# Patient Record
Sex: FEMALE | Race: WHITE | Employment: OTHER | URBAN - METROPOLITAN AREA
[De-identification: names, ages, dates, MRNs, and addresses within clinical notes are randomized per-mention and may not be internally consistent; named-entity substitution may affect disease eponyms.]

---

## 2017-02-21 ENCOUNTER — TELEPHONE (OUTPATIENT)
Dept: ENDOCRINOLOGY | Age: 37
End: 2017-02-21

## 2017-03-15 ENCOUNTER — OFFICE VISIT (OUTPATIENT)
Dept: ENDOCRINOLOGY | Age: 37
End: 2017-03-15

## 2017-03-15 VITALS
OXYGEN SATURATION: 98 % | BODY MASS INDEX: 32.36 KG/M2 | HEART RATE: 125 BPM | DIASTOLIC BLOOD PRESSURE: 80 MMHG | HEIGHT: 67 IN | WEIGHT: 206.2 LBS | SYSTOLIC BLOOD PRESSURE: 120 MMHG

## 2017-03-15 DIAGNOSIS — E89.0 POSTOPERATIVE HYPOTHYROIDISM: Primary | ICD-10-CM

## 2017-03-15 DIAGNOSIS — E78.2 MIXED HYPERLIPIDEMIA: ICD-10-CM

## 2017-03-15 DIAGNOSIS — E22.1 HYPERPROLACTINEMIA (HCC): ICD-10-CM

## 2017-03-15 DIAGNOSIS — R63.5 WEIGHT GAIN, ABNORMAL: ICD-10-CM

## 2017-03-15 DIAGNOSIS — E55.9 VITAMIN D DEFICIENCY: ICD-10-CM

## 2017-03-15 DIAGNOSIS — E04.1 THYROID NODULE: ICD-10-CM

## 2017-03-15 PROCEDURE — 99214 OFFICE O/P EST MOD 30 MIN: CPT | Performed by: NURSE PRACTITIONER

## 2017-03-30 ENCOUNTER — TELEPHONE (OUTPATIENT)
Dept: ENDOCRINOLOGY | Age: 37
End: 2017-03-30

## 2017-04-17 DIAGNOSIS — E89.0 POSTOPERATIVE HYPOTHYROIDISM: ICD-10-CM

## 2017-04-17 DIAGNOSIS — E22.1 HYPERPROLACTINEMIA (HCC): ICD-10-CM

## 2017-04-17 DIAGNOSIS — R63.5 WEIGHT GAIN, ABNORMAL: ICD-10-CM

## 2017-04-17 DIAGNOSIS — E78.2 MIXED HYPERLIPIDEMIA: ICD-10-CM

## 2017-04-17 DIAGNOSIS — E04.1 THYROID NODULE: ICD-10-CM

## 2017-04-17 DIAGNOSIS — E55.9 VITAMIN D DEFICIENCY: ICD-10-CM

## 2017-04-17 LAB
A/G RATIO: 1.3 (ref 1.1–2.2)
ALBUMIN SERPL-MCNC: 4.1 G/DL (ref 3.4–5)
ALP BLD-CCNC: 90 U/L (ref 40–129)
ALT SERPL-CCNC: 27 U/L (ref 10–40)
ANION GAP SERPL CALCULATED.3IONS-SCNC: 17 MMOL/L (ref 3–16)
AST SERPL-CCNC: 18 U/L (ref 15–37)
BILIRUB SERPL-MCNC: 0.4 MG/DL (ref 0–1)
BUN BLDV-MCNC: 14 MG/DL (ref 7–20)
CALCIUM SERPL-MCNC: 9.9 MG/DL (ref 8.3–10.6)
CHLORIDE BLD-SCNC: 99 MMOL/L (ref 99–110)
CHOLESTEROL, TOTAL: 229 MG/DL (ref 0–199)
CO2: 27 MMOL/L (ref 21–32)
CORTISOL - AM: 0.9 UG/DL (ref 4.3–22.4)
CREAT SERPL-MCNC: 0.9 MG/DL (ref 0.6–1.1)
FOLATE: 18.3 NG/ML (ref 4.78–24.2)
GFR AFRICAN AMERICAN: >60
GFR NON-AFRICAN AMERICAN: >60
GLOBULIN: 3.1 G/DL
GLUCOSE BLD-MCNC: 75 MG/DL (ref 70–99)
HDLC SERPL-MCNC: 48 MG/DL (ref 40–60)
HOMOCYSTEINE: 8 UMOL/L (ref 0–10)
LDL CHOLESTEROL CALCULATED: ABNORMAL MG/DL
LDL CHOLESTEROL DIRECT: 121 MG/DL
POTASSIUM SERPL-SCNC: 4.4 MMOL/L (ref 3.5–5.1)
PROLACTIN: 52.6 NG/ML
SODIUM BLD-SCNC: 143 MMOL/L (ref 136–145)
T3 FREE: 2.4 PG/ML (ref 2.3–4.2)
T4 FREE: 0.9 NG/DL (ref 0.9–1.8)
TOTAL PROTEIN: 7.2 G/DL (ref 6.4–8.2)
TRIGL SERPL-MCNC: 388 MG/DL (ref 0–150)
TSH SERPL DL<=0.05 MIU/L-ACNC: 3.94 UIU/ML (ref 0.27–4.2)
VITAMIN B-12: 686 PG/ML (ref 211–911)
VITAMIN D 25-HYDROXY: 31 NG/ML
VLDLC SERPL CALC-MCNC: ABNORMAL MG/DL

## 2017-04-18 ENCOUNTER — TELEPHONE (OUTPATIENT)
Dept: ENDOCRINOLOGY | Age: 37
End: 2017-04-18

## 2017-04-18 DIAGNOSIS — E22.1 HYPERPROLACTINEMIA (HCC): Primary | ICD-10-CM

## 2017-04-18 DIAGNOSIS — E89.0 POSTOPERATIVE HYPOTHYROIDISM: Primary | ICD-10-CM

## 2017-04-18 DIAGNOSIS — R79.89 LOW SERUM CORTISOL LEVEL: ICD-10-CM

## 2017-04-18 RX ORDER — HYDROCORTISONE 5 MG/1
TABLET ORAL
Qty: 90 TABLET | Refills: 3 | Status: SHIPPED | OUTPATIENT
Start: 2017-04-18 | End: 2017-04-18 | Stop reason: CLARIF

## 2017-04-19 LAB
ADRENOCORTICOTROPIC HORMONE: 8 PG/ML (ref 6–58)
METHYLMALONIC ACID: 0.17 UMOL/L (ref 0–0.4)

## 2017-04-21 LAB — T3 REVERSE: 13.9 NG/DL (ref 9–27)

## 2017-05-16 ENCOUNTER — TELEPHONE (OUTPATIENT)
Dept: ENDOCRINOLOGY | Age: 37
End: 2017-05-16

## 2017-08-21 RX ORDER — THYROID 60 MG
TABLET ORAL
Qty: 30 TABLET | Refills: 0 | Status: SHIPPED | OUTPATIENT
Start: 2017-08-21 | End: 2018-02-06 | Stop reason: SDUPTHER

## 2018-01-09 ENCOUNTER — OFFICE VISIT (OUTPATIENT)
Dept: INTERNAL MEDICINE CLINIC | Age: 38
End: 2018-01-09

## 2018-01-09 VITALS
BODY MASS INDEX: 38.45 KG/M2 | HEIGHT: 67 IN | OXYGEN SATURATION: 97 % | DIASTOLIC BLOOD PRESSURE: 60 MMHG | HEART RATE: 100 BPM | SYSTOLIC BLOOD PRESSURE: 110 MMHG | WEIGHT: 245 LBS | TEMPERATURE: 99.3 F

## 2018-01-09 DIAGNOSIS — R68.89 FLU-LIKE SYMPTOMS: ICD-10-CM

## 2018-01-09 DIAGNOSIS — J06.9 UPPER RESPIRATORY TRACT INFECTION, UNSPECIFIED TYPE: ICD-10-CM

## 2018-01-09 DIAGNOSIS — Z01.818 PREOPERATIVE EXAMINATION: Primary | ICD-10-CM

## 2018-01-09 DIAGNOSIS — K21.9 GASTROESOPHAGEAL REFLUX DISEASE, ESOPHAGITIS PRESENCE NOT SPECIFIED: ICD-10-CM

## 2018-01-09 DIAGNOSIS — G43.009 MIGRAINE WITHOUT AURA AND WITHOUT STATUS MIGRAINOSUS, NOT INTRACTABLE: ICD-10-CM

## 2018-01-09 DIAGNOSIS — E89.0 POSTOPERATIVE HYPOTHYROIDISM: ICD-10-CM

## 2018-01-09 LAB
INFLUENZA A ANTIBODY: NORMAL
INFLUENZA B ANTIBODY: NORMAL

## 2018-01-09 PROCEDURE — G8484 FLU IMMUNIZE NO ADMIN: HCPCS | Performed by: INTERNAL MEDICINE

## 2018-01-09 PROCEDURE — 87804 INFLUENZA ASSAY W/OPTIC: CPT | Performed by: INTERNAL MEDICINE

## 2018-01-09 PROCEDURE — 99214 OFFICE O/P EST MOD 30 MIN: CPT | Performed by: INTERNAL MEDICINE

## 2018-01-09 PROCEDURE — G8427 DOCREV CUR MEDS BY ELIG CLIN: HCPCS | Performed by: INTERNAL MEDICINE

## 2018-01-09 PROCEDURE — G8417 CALC BMI ABV UP PARAM F/U: HCPCS | Performed by: INTERNAL MEDICINE

## 2018-01-09 RX ORDER — OMEPRAZOLE 20 MG/1
20 CAPSULE, DELAYED RELEASE ORAL DAILY
Qty: 30 CAPSULE | Refills: 1 | Status: SHIPPED | OUTPATIENT
Start: 2018-01-09 | End: 2018-01-09 | Stop reason: SDUPTHER

## 2018-01-09 RX ORDER — BENZONATATE 100 MG/1
100 CAPSULE ORAL 3 TIMES DAILY PRN
Qty: 30 CAPSULE | Refills: 0 | Status: SHIPPED | OUTPATIENT
Start: 2018-01-09 | End: 2018-02-16 | Stop reason: SDUPTHER

## 2018-01-09 RX ORDER — OMEPRAZOLE 20 MG/1
CAPSULE, DELAYED RELEASE ORAL
Qty: 90 CAPSULE | Refills: 0 | Status: SHIPPED | OUTPATIENT
Start: 2018-01-09 | End: 2018-04-09

## 2018-01-09 RX ORDER — LORATADINE 10 MG/1
10 TABLET ORAL DAILY
Qty: 10 TABLET | Refills: 0 | Status: SHIPPED | OUTPATIENT
Start: 2018-01-09 | End: 2018-02-16 | Stop reason: SDUPTHER

## 2018-01-09 ASSESSMENT — ENCOUNTER SYMPTOMS
SORE THROAT: 1
NAUSEA: 0
SINUS PRESSURE: 0
WHEEZING: 0
SHORTNESS OF BREATH: 0
VOMITING: 0
RHINORRHEA: 1
SINUS PAIN: 0
CONSTIPATION: 0
ABDOMINAL PAIN: 0
COUGH: 1
DIARRHEA: 0
CHEST TIGHTNESS: 0

## 2018-01-09 ASSESSMENT — PATIENT HEALTH QUESTIONNAIRE - PHQ9
SUM OF ALL RESPONSES TO PHQ9 QUESTIONS 1 & 2: 0
1. LITTLE INTEREST OR PLEASURE IN DOING THINGS: 0
SUM OF ALL RESPONSES TO PHQ QUESTIONS 1-9: 0
2. FEELING DOWN, DEPRESSED OR HOPELESS: 0

## 2018-01-09 NOTE — PROGRESS NOTES
Evangelina Loza   YOB: 1980    Date of Visit:  1/9/2018    Chief Complaint   Patient presents with    Established New Doctor    Cough    Pre-op Exam     1/23/2018 surgery for Gastric Bypass       HPI  Pt presents to establish care. Pt states she needs a preoperative exam for gastric bypass surgery scheduled for 1/23/18 to be done by Dr. Shreyas Bolaños for a Laparoscopic Ambar-en-y gastric bypass and laparoscopic removal of gastric band and port for obesity. Co-morbidities of sleep apnea and GERD. Pt states she has had cardiac clearance and is scheduled for preoperative labs on 1/16/18. Pt states she has a preoperative exam with Dr. Vikram Lacy on 1/12/18. Pt has postoperative hypothyroidism. Pt takes [place] Thyroid. Pt sees Psychiatry for major depression, anxiety, and insomnia. Pt has migraine headaches. Pt has 5 migraines per month. Migraines located forehead and throbbing. Pt has light sensitivity and noise sensitivity. Pt has nausea and vomiting with migraines. Pt takes Imitrex and Zofran. Pt has GERD. Pt takes Prilosec. Pt has heartburn and reflux sometimes. Pt states she avoids spicy foods and has cut back caffeine. Pt has 3 day h/o dry cough, sore throat, hurts to swallow a little, feeling feverish (didn't take temperature), little chills, and little runny nose. Pt states she has been sleeping a lot. Pt states she has been taking an otc DM cough syrup and Ibuprofen which helps. Review of Systems   Constitutional: Positive for chills and fever. Negative for appetite change, fatigue and unexpected weight change. HENT: Positive for rhinorrhea and sore throat. Negative for congestion, ear pain, postnasal drip, sinus pain, sinus pressure, sneezing and trouble swallowing. Eyes: Negative for visual disturbance. Respiratory: Positive for cough. Negative for chest tightness, shortness of breath and wheezing.     Cardiovascular: Negative for chest pain, palpitations and leg swelling. Gastrointestinal: Negative for abdominal distention, abdominal pain, blood in stool, constipation, diarrhea, nausea and vomiting. Endocrine: Negative for cold intolerance and heat intolerance. Genitourinary: Negative for dysuria, frequency and hematuria. Musculoskeletal: Negative for arthralgias, back pain and myalgias. Skin: Negative for rash. Neurological: Positive for headaches. Negative for dizziness, tremors, syncope, weakness, light-headedness and numbness. Psychiatric/Behavioral: Negative for decreased concentration, dysphoric mood and sleep disturbance. The patient is not nervous/anxious. Past Medical History:   Diagnosis Date    ADHD (attention deficit hyperactivity disorder)     Amenorrhea     Anemia     Anxiety     Depression     Frequent headaches     History of colonic polyps     Hyperprolactinemia (HCC)     Hypothyroidism     Mitral valve prolapse     Obstructive sleep apnea     Thyroid nodule     UTI (lower urinary tract infection)     frequent, pyelonephritis       Allergies   Allergen Reactions    Bactrim [Sulfamethoxazole-Trimethoprim] Nausea And Vomiting     Outpatient Prescriptions Marked as Taking for the 1/9/18 encounter (Office Visit) with Jennie Hernandez MD   Medication Sig Dispense Refill    ARMOUR THYROID 60 MG tablet TAKE 1 TABLET BY MOUTH EVERY DAY 30 tablet 0    dicyclomine (BENTYL) 10 MG capsule Take 1 capsule by mouth every 6 hours as needed (cramps) 20 capsule 0    Venlafaxine HCl (EFFEXOR PO) Take by mouth      buPROPion (WELLBUTRIN XL) 300 MG XL tablet   Take 300 mg by mouth every morning       zolpidem (AMBIEN) 10 MG tablet   Take 10 mg by mouth nightly as needed       SUMAtriptan (IMITREX) 100 MG tablet   Take 100 mg by mouth once as needed       clonazePAM (KLONOPIN) 2 MG tablet Take 2 mg by mouth 3 times daily as needed for Anxiety.            Vitals:    01/09/18 1508   BP: 110/60   Site: Right Arm   Position:

## 2018-01-11 ENCOUNTER — TELEPHONE (OUTPATIENT)
Dept: INTERNAL MEDICINE CLINIC | Age: 38
End: 2018-01-11

## 2018-01-11 DIAGNOSIS — J06.9 UPPER RESPIRATORY TRACT INFECTION, UNSPECIFIED TYPE: Primary | ICD-10-CM

## 2018-01-11 RX ORDER — AZITHROMYCIN 250 MG/1
TABLET, FILM COATED ORAL
Qty: 1 PACKET | Refills: 0 | Status: SHIPPED | OUTPATIENT
Start: 2018-01-11 | End: 2018-01-21

## 2018-01-11 RX ORDER — BROMPHENIRAMINE MALEATE, PSEUDOEPHEDRINE HYDROCHLORIDE, AND DEXTROMETHORPHAN HYDROBROMIDE 2; 30; 10 MG/5ML; MG/5ML; MG/5ML
5 SYRUP ORAL 4 TIMES DAILY PRN
Qty: 120 ML | Refills: 0 | Status: SHIPPED | OUTPATIENT
Start: 2018-01-11 | End: 2018-01-11 | Stop reason: SDUPTHER

## 2018-01-11 RX ORDER — BROMPHENIRAMINE MALEATE, PSEUDOEPHEDRINE HYDROCHLORIDE, AND DEXTROMETHORPHAN HYDROBROMIDE 2; 30; 10 MG/5ML; MG/5ML; MG/5ML
5 SYRUP ORAL 4 TIMES DAILY PRN
Qty: 120 ML | Refills: 0 | Status: SHIPPED | COMMUNITY
Start: 2018-01-11 | End: 2018-01-11 | Stop reason: SDUPTHER

## 2018-01-11 RX ORDER — AZITHROMYCIN 250 MG/1
TABLET, FILM COATED ORAL
Qty: 1 PACKET | Refills: 0 | Status: SHIPPED | OUTPATIENT
Start: 2018-01-11 | End: 2018-01-11 | Stop reason: SDUPTHER

## 2018-01-11 RX ORDER — BROMPHENIRAMINE MALEATE, PSEUDOEPHEDRINE HYDROCHLORIDE, AND DEXTROMETHORPHAN HYDROBROMIDE 2; 30; 10 MG/5ML; MG/5ML; MG/5ML
5 SYRUP ORAL 4 TIMES DAILY PRN
Qty: 120 ML | Refills: 0 | Status: SHIPPED | OUTPATIENT
Start: 2018-01-11 | End: 2018-04-09 | Stop reason: ALTCHOICE

## 2018-01-11 ASSESSMENT — ENCOUNTER SYMPTOMS
TROUBLE SWALLOWING: 0
BACK PAIN: 0
ABDOMINAL DISTENTION: 0
BLOOD IN STOOL: 0

## 2018-01-30 ENCOUNTER — TELEPHONE (OUTPATIENT)
Dept: INTERNAL MEDICINE CLINIC | Age: 38
End: 2018-01-30

## 2018-02-01 ENCOUNTER — TELEPHONE (OUTPATIENT)
Dept: INTERNAL MEDICINE CLINIC | Age: 38
End: 2018-02-01

## 2018-02-06 ENCOUNTER — OFFICE VISIT (OUTPATIENT)
Dept: INTERNAL MEDICINE CLINIC | Age: 38
End: 2018-02-06

## 2018-02-06 VITALS
WEIGHT: 236.4 LBS | RESPIRATION RATE: 16 BRPM | BODY MASS INDEX: 37.1 KG/M2 | SYSTOLIC BLOOD PRESSURE: 104 MMHG | DIASTOLIC BLOOD PRESSURE: 78 MMHG | HEIGHT: 67 IN | TEMPERATURE: 98.1 F | HEART RATE: 110 BPM | OXYGEN SATURATION: 98 %

## 2018-02-06 DIAGNOSIS — R06.2 WHEEZING: ICD-10-CM

## 2018-02-06 DIAGNOSIS — R06.02 SHORTNESS OF BREATH: ICD-10-CM

## 2018-02-06 DIAGNOSIS — G43.009 MIGRAINE WITHOUT AURA AND WITHOUT STATUS MIGRAINOSUS, NOT INTRACTABLE: ICD-10-CM

## 2018-02-06 DIAGNOSIS — R03.0 TRANSIENT ELEVATED BLOOD PRESSURE: ICD-10-CM

## 2018-02-06 DIAGNOSIS — E89.0 POSTOPERATIVE HYPOTHYROIDISM: Primary | ICD-10-CM

## 2018-02-06 DIAGNOSIS — E04.1 THYROID NODULE: ICD-10-CM

## 2018-02-06 DIAGNOSIS — Z23 NEEDS FLU SHOT: ICD-10-CM

## 2018-02-06 DIAGNOSIS — L30.4 INTERTRIGO: ICD-10-CM

## 2018-02-06 LAB
T4 FREE: 1 NG/DL (ref 0.9–1.8)
TSH SERPL DL<=0.05 MIU/L-ACNC: 0.63 UIU/ML (ref 0.27–4.2)

## 2018-02-06 PROCEDURE — G0008 ADMIN INFLUENZA VIRUS VAC: HCPCS | Performed by: INTERNAL MEDICINE

## 2018-02-06 PROCEDURE — 99214 OFFICE O/P EST MOD 30 MIN: CPT | Performed by: INTERNAL MEDICINE

## 2018-02-06 PROCEDURE — 1036F TOBACCO NON-USER: CPT | Performed by: INTERNAL MEDICINE

## 2018-02-06 PROCEDURE — G8427 DOCREV CUR MEDS BY ELIG CLIN: HCPCS | Performed by: INTERNAL MEDICINE

## 2018-02-06 PROCEDURE — G8417 CALC BMI ABV UP PARAM F/U: HCPCS | Performed by: INTERNAL MEDICINE

## 2018-02-06 PROCEDURE — 90630 INFLUENZA, QUADV, 18-64 YRS, ID, PF, MICRO INJ, 0.1ML (FLUZONE QUADV, PF): CPT | Performed by: INTERNAL MEDICINE

## 2018-02-06 PROCEDURE — G8484 FLU IMMUNIZE NO ADMIN: HCPCS | Performed by: INTERNAL MEDICINE

## 2018-02-06 RX ORDER — SUMATRIPTAN 100 MG/1
100 TABLET, FILM COATED ORAL
Qty: 9 TABLET | Refills: 3 | Status: SHIPPED | OUTPATIENT
Start: 2018-02-06 | End: 2018-07-04 | Stop reason: SDUPTHER

## 2018-02-06 RX ORDER — LEVOTHYROXINE AND LIOTHYRONINE 38; 9 UG/1; UG/1
TABLET ORAL
Qty: 30 TABLET | Refills: 0 | Status: SHIPPED | OUTPATIENT
Start: 2018-02-06 | End: 2018-03-05 | Stop reason: SDUPTHER

## 2018-02-06 RX ORDER — NYSTATIN 100000 U/G
CREAM TOPICAL
Qty: 30 G | Refills: 0 | Status: SHIPPED | OUTPATIENT
Start: 2018-02-06 | End: 2018-03-25 | Stop reason: SDUPTHER

## 2018-02-06 RX ORDER — ALBUTEROL SULFATE 90 UG/1
2 AEROSOL, METERED RESPIRATORY (INHALATION) EVERY 6 HOURS PRN
Qty: 1 INHALER | Refills: 0 | Status: SHIPPED | OUTPATIENT
Start: 2018-02-06 | End: 2018-04-05 | Stop reason: SDUPTHER

## 2018-02-06 ASSESSMENT — ENCOUNTER SYMPTOMS
SHORTNESS OF BREATH: 1
DIARRHEA: 0
WHEEZING: 1
CONSTIPATION: 0
SORE THROAT: 0
TROUBLE SWALLOWING: 0

## 2018-02-06 NOTE — PATIENT INSTRUCTIONS
-Chest xray  -Pulmonary function tests- Spirometry with and without bronchodilator and methacholine challenge  -Thyroid ultrasound  -Continue same medications  -Nystatin cream twice daily   -ProAir HFA inhaler 2 puffs every 6 hours as needed for shortness of breath and/or wheezing  -Referral to Endocrinology

## 2018-02-06 NOTE — PROGRESS NOTES
Eyes: Negative for visual disturbance. Respiratory: Positive for shortness of breath and wheezing. Negative for cough and chest tightness. Cardiovascular: Negative for chest pain, palpitations and leg swelling. Gastrointestinal: Negative for abdominal pain, blood in stool, constipation, diarrhea, nausea and vomiting. Endocrine: Positive for cold intolerance. Negative for heat intolerance. Genitourinary: Negative for dysuria, frequency and hematuria. Musculoskeletal: Negative for arthralgias and myalgias. Skin: Negative for rash. Neurological: Positive for headaches. Negative for dizziness, tremors, syncope, weakness, light-headedness and numbness. Psychiatric/Behavioral: Negative for dysphoric mood and sleep disturbance. The patient is not nervous/anxious. Allergies   Allergen Reactions    Bactrim [Sulfamethoxazole-Trimethoprim] Nausea And Vomiting    Naproxen Nausea And Vomiting     Outpatient Prescriptions Marked as Taking for the 2/6/18 encounter (Office Visit) with Timur Dey MD   Medication Sig Dispense Refill    loratadine (CLARITIN) 10 MG tablet Take 1 tablet by mouth daily 10 tablet 0    omeprazole (PRILOSEC) 20 MG delayed release capsule TAKE ONE CAPSULE BY MOUTH DAILY 90 capsule 0    ARMOUR THYROID 60 MG tablet TAKE 1 TABLET BY MOUTH EVERY DAY 30 tablet 0    dicyclomine (BENTYL) 10 MG capsule Take 1 capsule by mouth every 6 hours as needed (cramps) 20 capsule 0    Venlafaxine HCl (EFFEXOR PO) Take by mouth      buPROPion (WELLBUTRIN XL) 300 MG XL tablet   Take 300 mg by mouth every morning       zolpidem (AMBIEN) 10 MG tablet   Take 10 mg by mouth nightly as needed       SUMAtriptan (IMITREX) 100 MG tablet   Take 100 mg by mouth once as needed       clonazePAM (KLONOPIN) 2 MG tablet Take 2 mg by mouth 3 times daily as needed for Anxiety.            Vitals:    02/06/18 1412   BP: 104/78   Site: Right Arm   Position: Sitting   Cuff Size: Medium Adult   Pulse: 110

## 2018-02-12 ASSESSMENT — ENCOUNTER SYMPTOMS
SINUS PRESSURE: 0
VOMITING: 0
BLOOD IN STOOL: 0
CHEST TIGHTNESS: 0
RHINORRHEA: 0
COUGH: 0
NAUSEA: 0
ABDOMINAL PAIN: 0

## 2018-02-14 ENCOUNTER — TELEPHONE (OUTPATIENT)
Dept: INTERNAL MEDICINE CLINIC | Age: 38
End: 2018-02-14

## 2018-02-16 DIAGNOSIS — J06.9 UPPER RESPIRATORY TRACT INFECTION, UNSPECIFIED TYPE: ICD-10-CM

## 2018-02-16 RX ORDER — LORATADINE 10 MG/1
10 TABLET ORAL DAILY
Qty: 10 TABLET | Refills: 0 | Status: SHIPPED | OUTPATIENT
Start: 2018-02-16 | End: 2018-04-09

## 2018-02-16 RX ORDER — BENZONATATE 100 MG/1
CAPSULE ORAL
Qty: 30 CAPSULE | Refills: 0 | Status: SHIPPED | OUTPATIENT
Start: 2018-02-16 | End: 2018-04-09 | Stop reason: ALTCHOICE

## 2018-02-19 PROBLEM — R05.9 COUGH: Status: ACTIVE | Noted: 2018-02-19

## 2018-02-20 ENCOUNTER — TELEPHONE (OUTPATIENT)
Dept: INTERNAL MEDICINE CLINIC | Age: 38
End: 2018-02-20

## 2018-03-05 DIAGNOSIS — E89.0 POSTOPERATIVE HYPOTHYROIDISM: ICD-10-CM

## 2018-03-06 RX ORDER — THYROID 60 MG
TABLET ORAL
Qty: 30 TABLET | Refills: 2 | Status: SHIPPED | OUTPATIENT
Start: 2018-03-06 | End: 2018-04-09 | Stop reason: SDUPTHER

## 2018-03-25 DIAGNOSIS — L30.4 INTERTRIGO: ICD-10-CM

## 2018-03-26 RX ORDER — NYSTATIN 100000 U/G
CREAM TOPICAL
Qty: 30 G | Refills: 0 | Status: SHIPPED | OUTPATIENT
Start: 2018-03-26 | End: 2018-04-09

## 2018-03-26 NOTE — TELEPHONE ENCOUNTER
Last Office Visit: 2/6/2018  Future Office Visit: 4/9/2018  Last Filled: 2/6/2018 Nystatin [id number] unit/gm cream disp # 30

## 2018-04-05 DIAGNOSIS — R06.2 WHEEZING: ICD-10-CM

## 2018-04-05 DIAGNOSIS — R06.02 SHORTNESS OF BREATH: ICD-10-CM

## 2018-04-09 ENCOUNTER — OFFICE VISIT (OUTPATIENT)
Dept: ENDOCRINOLOGY | Age: 38
End: 2018-04-09

## 2018-04-09 VITALS
HEART RATE: 101 BPM | DIASTOLIC BLOOD PRESSURE: 80 MMHG | BODY MASS INDEX: 32.71 KG/M2 | WEIGHT: 208.4 LBS | HEIGHT: 67 IN | OXYGEN SATURATION: 97 % | SYSTOLIC BLOOD PRESSURE: 102 MMHG

## 2018-04-09 DIAGNOSIS — N91.4 SECONDARY OLIGOMENORRHEA: ICD-10-CM

## 2018-04-09 DIAGNOSIS — E55.9 VITAMIN D DEFICIENCY: ICD-10-CM

## 2018-04-09 DIAGNOSIS — E27.8 ADRENAL NODULE (HCC): ICD-10-CM

## 2018-04-09 DIAGNOSIS — E04.2 MULTINODULAR GOITER: ICD-10-CM

## 2018-04-09 DIAGNOSIS — E66.9 CLASS 1 OBESITY WITH BODY MASS INDEX (BMI) OF 32.0 TO 32.9 IN ADULT, UNSPECIFIED OBESITY TYPE, UNSPECIFIED WHETHER SERIOUS COMORBIDITY PRESENT: ICD-10-CM

## 2018-04-09 DIAGNOSIS — E03.9 ACQUIRED HYPOTHYROIDISM: Primary | ICD-10-CM

## 2018-04-09 DIAGNOSIS — E89.0 POSTOPERATIVE HYPOTHYROIDISM: ICD-10-CM

## 2018-04-09 DIAGNOSIS — R23.2 FLUSHING: ICD-10-CM

## 2018-04-09 DIAGNOSIS — E22.1 HYPERPROLACTINEMIA (HCC): ICD-10-CM

## 2018-04-09 PROBLEM — E27.9 ADRENAL NODULE (HCC): Status: ACTIVE | Noted: 2018-04-09

## 2018-04-09 PROBLEM — E66.811 CLASS 1 OBESITY IN ADULT: Status: ACTIVE | Noted: 2018-04-09

## 2018-04-09 PROCEDURE — 99215 OFFICE O/P EST HI 40 MIN: CPT | Performed by: INTERNAL MEDICINE

## 2018-04-09 PROCEDURE — G8427 DOCREV CUR MEDS BY ELIG CLIN: HCPCS | Performed by: INTERNAL MEDICINE

## 2018-04-09 PROCEDURE — 1036F TOBACCO NON-USER: CPT | Performed by: INTERNAL MEDICINE

## 2018-04-09 PROCEDURE — G8417 CALC BMI ABV UP PARAM F/U: HCPCS | Performed by: INTERNAL MEDICINE

## 2018-04-09 RX ORDER — RISPERIDONE 2 MG/1
2 TABLET, FILM COATED ORAL NIGHTLY
Qty: 60 TABLET | Refills: 0
Start: 2018-04-09

## 2018-04-09 RX ORDER — THYROID 60 MG
TABLET ORAL
Qty: 90 TABLET | Refills: 0 | Status: SHIPPED | OUTPATIENT
Start: 2018-04-09 | End: 2018-10-12 | Stop reason: SDUPTHER

## 2018-04-09 ASSESSMENT — PATIENT HEALTH QUESTIONNAIRE - PHQ9
2. FEELING DOWN, DEPRESSED OR HOPELESS: 0
SUM OF ALL RESPONSES TO PHQ QUESTIONS 1-9: 0
SUM OF ALL RESPONSES TO PHQ9 QUESTIONS 1 & 2: 0
1. LITTLE INTEREST OR PLEASURE IN DOING THINGS: 0

## 2018-04-11 PROBLEM — R05.9 COUGH: Status: RESOLVED | Noted: 2018-02-19 | Resolved: 2018-04-11

## 2018-04-12 DIAGNOSIS — E55.9 VITAMIN D DEFICIENCY: ICD-10-CM

## 2018-04-12 DIAGNOSIS — E22.1 HYPERPROLACTINEMIA (HCC): ICD-10-CM

## 2018-04-12 DIAGNOSIS — E03.9 ACQUIRED HYPOTHYROIDISM: ICD-10-CM

## 2018-04-12 DIAGNOSIS — N91.4 SECONDARY OLIGOMENORRHEA: ICD-10-CM

## 2018-04-12 DIAGNOSIS — E89.0 POSTOPERATIVE HYPOTHYROIDISM: ICD-10-CM

## 2018-04-12 DIAGNOSIS — E27.8 ADRENAL NODULE (HCC): ICD-10-CM

## 2018-04-12 DIAGNOSIS — R23.2 FLUSHING: ICD-10-CM

## 2018-04-12 LAB
24HR URINE VOLUME (ML): 1300 ML
ANTI-THYROGLOB ABS: <10 IU/ML
CORTISOL - AM: 15 UG/DL (ref 4.3–22.4)
CREAT SERPL-MCNC: 0.9 MG/DL (ref 0.6–1.2)
CREATININE 24 HOUR URINE: 1.2 G/24HR (ref 0.6–1.5)
CREATININE CLEARANCE: 95 ML/MIN (ref 88–128)
ESTRADIOL LEVEL: 24 PG/ML
FOLLICLE STIMULATING HORMONE: 10.9 MIU/ML
LUTEINIZING HORMONE: 11.9 MIU/ML
Lab: 24 HR
PROLACTIN: 39.3 NG/ML
T3 FREE: 2.9 PG/ML (ref 2.3–4.2)
T3 TOTAL: 1.32 NG/ML (ref 0.8–2)
T4 FREE: 0.9 NG/DL (ref 0.9–1.8)
T4 TOTAL: 5.8 UG/DL (ref 4.5–10.9)
THYROID PEROXIDASE (TPO) ABS: 16 IU/ML
TSH SERPL DL<=0.05 MIU/L-ACNC: 1.32 UIU/ML (ref 0.27–4.2)
VITAMIN D 25-HYDROXY: 42.6 NG/ML

## 2018-04-13 LAB
ADRENOCORTICOTROPIC HORMONE: 44 PG/ML (ref 6–58)
DHEAS (DHEA SULFATE): 281 UG/DL (ref 45–270)
INSULIN COMMENT: NORMAL
INSULIN REFERENCE RANGE:: NORMAL
INSULIN: 18.1 MU/L
SEX HORMONE BINDING GLOBULIN: 32 NMOL/L (ref 30–135)
TESTOSTERONE FREE-NONMALE: 16.6 PG/ML (ref 1.3–9.2)
TESTOSTERONE TOTAL: 89 NG/DL (ref 20–70)

## 2018-04-14 LAB
ALDOSTERONE: 29 NG/DL
IGF-1 (INSULIN-LIKE GROWTH I): 125 NG/ML (ref 80–277)
INSULIN-LIKE GROWTH FACTOR-1 Z-SCORE: -0.7
RENIN ACTIVITY: 5.1 NG/ML/HR

## 2018-04-15 LAB
5 HIAA URINE (PER GM CREAT): 4 MG/GCR (ref 0–14)
5-HIAA 24 HOUR URINE: 5 MG/D (ref 0–15)
5-HIAA INTERPRETATION: NORMAL
5-HIAA URINE: 3.9 MG/L

## 2018-04-16 LAB
CATE U INT: NORMAL
CORTISOL (UR), FREE: 9.6 UG/D
CORTISOL URINE, FREE (/G CRT): 7.71 UG/G CRT
CORTISOL,F,UG/L,U: 7.4 UG/L
CREATININE 24 HOUR URINE: 1248 MG/D (ref 700–1600)
CREATININE URINE: 96 MG/DL
DOPAMINE (G CRT): 133 UG/G CRT (ref 0–250)
DOPAMINE 24 HOUR URINE: 166 UG/D (ref 77–324)
DOPAMINE, (UG/L): 128 UG/L
EPINEPHRINE (G CRT): 4 UG/G CRT (ref 0–20)
EPINEPHRINE 24 HOUR URINE: 5 UG/D (ref 1–7)
EPINEPHRINE, (UG/L): 4 UG/L
HOURS COLLECTED: 24 HR
INTERPRETATION, OPI7M: NORMAL
METANEPH/PLASMA INTERP: NORMAL
METANEPHRINE FREE PLASMA: 0.12 NMOL/L (ref 0–0.49)
NOREPINEPH (G CRT): 21 UG/G CRT (ref 0–45)
NOREPINEPHRINE 24 HOUR URINE: 26 UG/D (ref 16–71)
NOREPINEPHRINE, (UG/L): 20 UG/L
NORMETANEPHRINE FREE PLASMA: 0.42 NMOL/L (ref 0–0.89)
T3 REVERSE: 14.5 NG/DL (ref 9–27)
URINE TOTAL VOLUME: 1300 ML

## 2018-04-17 LAB — 17-OH PROGESTERONE LCMS: 44.9 NG/DL

## 2018-04-24 ENCOUNTER — HOSPITAL ENCOUNTER (OUTPATIENT)
Dept: CT IMAGING | Age: 38
Discharge: HOME | End: 2018-04-24
Admitting: INTERNAL MEDICINE

## 2018-04-24 DIAGNOSIS — E27.8 ADRENAL NODULE (HCC): ICD-10-CM

## 2018-04-24 DIAGNOSIS — E04.2 NONTOXIC MULTINODULAR GOITER: ICD-10-CM

## 2018-04-24 DIAGNOSIS — E04.2 MULTINODULAR GOITER: ICD-10-CM

## 2018-05-01 ENCOUNTER — PATIENT MESSAGE (OUTPATIENT)
Dept: ENDOCRINOLOGY | Age: 38
End: 2018-05-01

## 2018-05-01 ENCOUNTER — TELEPHONE (OUTPATIENT)
Dept: ENDOCRINOLOGY | Age: 38
End: 2018-05-01

## 2018-05-09 ENCOUNTER — OFFICE VISIT (OUTPATIENT)
Dept: ENDOCRINOLOGY | Age: 38
End: 2018-05-09

## 2018-05-09 VITALS
WEIGHT: 201.2 LBS | SYSTOLIC BLOOD PRESSURE: 124 MMHG | HEART RATE: 108 BPM | DIASTOLIC BLOOD PRESSURE: 82 MMHG | OXYGEN SATURATION: 97 % | HEIGHT: 67 IN | BODY MASS INDEX: 31.58 KG/M2

## 2018-05-09 DIAGNOSIS — E28.2 PCOS (POLYCYSTIC OVARIAN SYNDROME): ICD-10-CM

## 2018-05-09 DIAGNOSIS — E04.2 MULTINODULAR GOITER: ICD-10-CM

## 2018-05-09 DIAGNOSIS — E66.9 CLASS 1 OBESITY WITH BODY MASS INDEX (BMI) OF 31.0 TO 31.9 IN ADULT, UNSPECIFIED OBESITY TYPE, UNSPECIFIED WHETHER SERIOUS COMORBIDITY PRESENT: ICD-10-CM

## 2018-05-09 DIAGNOSIS — E22.1 HYPERPROLACTINEMIA (HCC): ICD-10-CM

## 2018-05-09 DIAGNOSIS — E03.9 ACQUIRED HYPOTHYROIDISM: Primary | ICD-10-CM

## 2018-05-09 DIAGNOSIS — R23.2 FLUSHING: ICD-10-CM

## 2018-05-09 DIAGNOSIS — N91.4 SECONDARY OLIGOMENORRHEA: ICD-10-CM

## 2018-05-09 DIAGNOSIS — E55.9 VITAMIN D DEFICIENCY: ICD-10-CM

## 2018-05-09 DIAGNOSIS — E27.8 ADRENAL NODULE (HCC): ICD-10-CM

## 2018-05-09 PROCEDURE — 1036F TOBACCO NON-USER: CPT | Performed by: INTERNAL MEDICINE

## 2018-05-09 PROCEDURE — G8427 DOCREV CUR MEDS BY ELIG CLIN: HCPCS | Performed by: INTERNAL MEDICINE

## 2018-05-09 PROCEDURE — G8417 CALC BMI ABV UP PARAM F/U: HCPCS | Performed by: INTERNAL MEDICINE

## 2018-05-09 PROCEDURE — 99215 OFFICE O/P EST HI 40 MIN: CPT | Performed by: INTERNAL MEDICINE

## 2018-05-09 ASSESSMENT — PATIENT HEALTH QUESTIONNAIRE - PHQ9
2. FEELING DOWN, DEPRESSED OR HOPELESS: 0
1. LITTLE INTEREST OR PLEASURE IN DOING THINGS: 0
SUM OF ALL RESPONSES TO PHQ QUESTIONS 1-9: 0
SUM OF ALL RESPONSES TO PHQ9 QUESTIONS 1 & 2: 0

## 2018-06-13 RX ORDER — PROMETHAZINE HYDROCHLORIDE 25 MG/1
TABLET ORAL
Qty: 32 TABLET | Refills: 0 | Status: SHIPPED | OUTPATIENT
Start: 2018-06-13 | End: 2018-09-25 | Stop reason: SDUPTHER

## 2018-07-04 DIAGNOSIS — G43.009 MIGRAINE WITHOUT AURA AND WITHOUT STATUS MIGRAINOSUS, NOT INTRACTABLE: ICD-10-CM

## 2018-07-05 RX ORDER — SUMATRIPTAN 100 MG/1
100 TABLET, FILM COATED ORAL
Qty: 9 TABLET | Refills: 0 | Status: SHIPPED | OUTPATIENT
Start: 2018-07-05 | End: 2018-10-30

## 2018-07-10 RX ORDER — TRAZODONE HYDROCHLORIDE 50 MG/1
TABLET ORAL
Qty: 30 TABLET | Refills: 0 | Status: SHIPPED | OUTPATIENT
Start: 2018-07-10 | End: 2018-08-20 | Stop reason: ALTCHOICE

## 2018-07-12 ENCOUNTER — TELEPHONE (OUTPATIENT)
Dept: INTERNAL MEDICINE CLINIC | Age: 38
End: 2018-07-12

## 2018-08-16 ENCOUNTER — TELEPHONE (OUTPATIENT)
Dept: ENDOCRINOLOGY | Age: 38
End: 2018-08-16

## 2018-08-20 ENCOUNTER — OFFICE VISIT (OUTPATIENT)
Dept: ENDOCRINOLOGY | Age: 38
End: 2018-08-20

## 2018-08-20 VITALS
DIASTOLIC BLOOD PRESSURE: 62 MMHG | HEART RATE: 97 BPM | WEIGHT: 177 LBS | OXYGEN SATURATION: 98 % | HEIGHT: 67 IN | SYSTOLIC BLOOD PRESSURE: 130 MMHG | BODY MASS INDEX: 27.78 KG/M2

## 2018-08-20 DIAGNOSIS — E66.3 OVERWEIGHT (BMI 25.0-29.9): ICD-10-CM

## 2018-08-20 DIAGNOSIS — E22.1 HYPERPROLACTINEMIA (HCC): ICD-10-CM

## 2018-08-20 DIAGNOSIS — E27.8 ADRENAL NODULE (HCC): ICD-10-CM

## 2018-08-20 DIAGNOSIS — E04.2 MULTINODULAR GOITER: ICD-10-CM

## 2018-08-20 DIAGNOSIS — E03.9 ACQUIRED HYPOTHYROIDISM: Primary | ICD-10-CM

## 2018-08-20 DIAGNOSIS — E78.2 MIXED HYPERLIPIDEMIA: ICD-10-CM

## 2018-08-20 DIAGNOSIS — N91.4 SECONDARY OLIGOMENORRHEA: ICD-10-CM

## 2018-08-20 DIAGNOSIS — E28.2 PCOS (POLYCYSTIC OVARIAN SYNDROME): ICD-10-CM

## 2018-08-20 DIAGNOSIS — E55.9 VITAMIN D DEFICIENCY: ICD-10-CM

## 2018-08-20 PROCEDURE — G8417 CALC BMI ABV UP PARAM F/U: HCPCS | Performed by: INTERNAL MEDICINE

## 2018-08-20 PROCEDURE — 1036F TOBACCO NON-USER: CPT | Performed by: INTERNAL MEDICINE

## 2018-08-20 PROCEDURE — 99214 OFFICE O/P EST MOD 30 MIN: CPT | Performed by: INTERNAL MEDICINE

## 2018-08-20 PROCEDURE — G8427 DOCREV CUR MEDS BY ELIG CLIN: HCPCS | Performed by: INTERNAL MEDICINE

## 2018-08-20 ASSESSMENT — PATIENT HEALTH QUESTIONNAIRE - PHQ9
SUM OF ALL RESPONSES TO PHQ9 QUESTIONS 1 & 2: 0
2. FEELING DOWN, DEPRESSED OR HOPELESS: 0
1. LITTLE INTEREST OR PLEASURE IN DOING THINGS: 0
SUM OF ALL RESPONSES TO PHQ QUESTIONS 1-9: 0
SUM OF ALL RESPONSES TO PHQ QUESTIONS 1-9: 0

## 2018-08-20 NOTE — PROGRESS NOTES
SUBJECTIVE:  Tony Lawton is a 41 y.o. female who is here for hypothyroidism, hyperprolactinemia, oligomenorrhea, vitamin D, thyroid nodule. 1. Acquired hypothyroidism    This started in 1996. Patient was diagnosed with thyroid nodules. The problem has been gradually worsening. Patient started medication in 1996. Currently patient is on: [place]. Misses  0 doses a month. Couldn't tolerate Synthroid. Current complaints: fatigue, dry skin, SOB, can't cool down. 2. Multinodular Goiter    History of obstructive symptoms: difficulty swallowing No, changes in voice/hoarseness No.    History of radiation to patient's neck: No  Resent iodine exposure: No  Family history includes no thyroid abnormalities. Family history of thyroid cancer: No    3. Hyperprolactinemia (HCC)  On risperidone. Has headaches, severe, takes Imitrex. Same. No galactorrhea. 4. Overweight  Gained 80 lbs for no reason. Had R&Y gastric bypass in 1/2018. Lost 45 lbs. Has purple stria on the breasts. 5. Secondary oligomenorrhea  Has hirsutism. Might be PCOS. Has no period for 3 years. Started periods at age 16. Regular, became irregular after Depo, stopped, then restarted. Missed last one. Not on Depoprovera for 6 years. Has spotting every other month. Has purple stria on the breasts. 6. Vitamin D deficiency  No bone pain    7. Left adrenal nodule  No abdominal pain. Has spells of anxiety, palpitations, flushing, 3 times a week, more frequent,;last 1 hour. 8.  PCOS  Has hirsutism, irregular periods. 10. Hyperlipidemia  No muscle pain    EXAMINATION:   CT ABDOMEN WITH AND WITHOUT CONTRAST 4/24/2018 2:01 pm       TECHNIQUE:   CT of the abdomen was performed without and with the administration of   intravenous contrast. Multiplanar reformatted images are provided for review.    Dose modulation, iterative reconstruction, and/or weight based adjustment of   the mA/kV was utilized to reduce the radiation dose to as low Vertebrobasilar Arteries:  Normal flow voids.              Visualized Bone Marrow:  Normal.                  Visualized Craniofacial Structures-   · Orbits:  Negative.       · Mastoid air cells:  Negative.                · Paranasal sinuses:  Negative.                             Impression   IMPRESSION:     No acute infarction, mass or hemorrhage is identified.        Normal pituitary gland. No microadenoma is identified.             Normal brain MRI with and without contrast.                         EXAMINATION:   CT OF THE ABDOMEN AND PELVIS WITH CONTRAST 12/10/2016 11:25 pm       TECHNIQUE:   CT of the abdomen and pelvis was performed with the administration of   intravenous contrast. Multiplanar reformatted images are provided for review.    Dose modulation, iterative reconstruction, and/or weight based adjustment of   the mA/kV was utilized to reduce the radiation dose to as low as reasonably   achievable.       COMPARISON:   07/10/2014.       HISTORY:   ORDERING SYSTEM PROVIDED HISTORY: abd pain, gastric sleeve in place   TECHNOLOGIST PROVIDED HISTORY:       Additional Contrast?->None   Ordering Physician Provided Reason for Exam: pain swelling   Acuity: Acute       FINDINGS:   Lower Chest: Dependent changes are seen within the lung bases bilaterally.       Organs: Patient is status post cholecystectomy.  The liver, spleen, pancreas   and right adrenal gland demonstrate no acute abnormality.  A 10 mm left   adrenal nodule is seen.  The kidneys demonstrate symmetric enhancement.  No   focal renal mass or hydronephrosis.       GI/Bowel: A lap band is seen in the region of the proximal stomach.  There is   no evidence of bowel obstruction.  Normal appendix.       Pelvis: No acute abnormality identified of the urinary bladder or uterus.       Peritoneum/Retroperitoneum: The abdominal aorta is normal in caliber.  There   is no bulky retroperitoneal or mesenteric adenopathy.  No free fluid or free   air seen in the abdomen or pelvis.       Bones/Soft Tissues: No acute osseous abnormality.           Impression   1. No acute abnormality identified within the abdomen or pelvis. 2. A lap band is seen in the region of the proximal stomach. 3. An indeterminate 10 mm left adrenal nodule is seen grossly similar to the   prior exam.             Result Narrative    This document is confidential medical information. Unauthorized disclosure or use of this   information is prohibited by law. If you are not the intended recipient of this document,                     please advise us by calling immediately [phone].    Thomas SUAREZNov[facility] Health, En[facility] HealthWY[place], [place]  [id number]                                               Cat Scan Report                    Date: 12     Name: Lydia Goldman #: [id number]   Loc: [facility]hotra #: [de-identified]   [facility] DEPT POD A               : 81   Ordering Physician: Philippe Grant M.D.             Procedure: Abdomen & Pelvis W/Contrast                                    Exam Date/Time: 12  2150   Admitting Diagnosis: VOMITING BLOOD                                                CT SCAN OF THE ABDOMEN AND PELVIS WITH CONTRAST. (12)      CLINICAL HISTORY: ABDOMINAL PAIN.      COMPARISON: CT SCAN 06.      Axial 5mm images were obtained through the abdomen and pelvis following intravenous and  oral contrast. Coronal reconstructed images were then performed.      Images through the lung bases demonstrate subsegmental atelectasis. The liver,  gallbladder, spleen, adrenal glands, pancreas and kidneys are normal. No aneurysm formation  is present. The bowel gas pattern is nonobstructive. The appendix is normal. Images through  the pelvis demonstrate a normal appearance to the urinary bladder.  Nabothian cysts are seen  within nausea, no vomiting, no diarrhea, no constipation, no heartburn, no bloating  Genitourinary: no dysuria, no urinary incontinence, no urinary hesitancy, no change in urinary frequency, no feelings of urinary urgency, no nocturia  Musculoskeletal: no joint swelling, has joint stiffness, has joint pain, no muscle cramps, no muscle pain, no bone pain  Integument/Breast: has hair loss, no skin rashes, no skin lesions, has itching, has dry skin, no breast pain, no breast mass, no skin hives, no skin discoloration, no nipple discharge  Neurological: no numbness, no tingling, no weakness, no confusion, has headaches, has dizziness, no fainting, no tremors, no decrease in memory, no balance problems  Psychiatric: no anxiety, no depression, has insomnia  Hematologic/Lymphatic: no tendency for easy bleeding, no swollen lymph nodes, no tendency for easy bruising  Immunology: no seasonal allergies, no frequent infections, has frequent illnesses  Endocrine: no temperature intolerance, no hot flashes, has hand tremor    OBJECTIVE:   /62 (Site: Right Arm, Position: Sitting, Cuff Size: Large Adult)   Pulse 97   Ht 5' 7\" (1.702 m)   Wt 177 lb (80.3 kg)   SpO2 98%   BMI 27.72 kg/m²   Wt Readings from Last 3 Encounters:   08/20/18 177 lb (80.3 kg)   05/09/18 201 lb 3.2 oz (91.3 kg)   04/09/18 208 lb 6.4 oz (94.5 kg)       Physical Exam:  Constitutional: no acute distress, well appearing, well nourished  Psychiatric: oriented to person, place and time, judgement, insight and normal, recent and remote memory and intact and mood, affect are normal  Skin: skin and subcutaneous tissue is normal without mass, normal turgor, Has purple stria on the breasts  Head and Face: examination of head and face revealed no abnormalities  Eyes: no lid or conjunctival swelling, no erythema or discharge, pupils are normal, equal, round, and reactive to light  Ears/Nose: external inspection of ears and nose revealed no abnormalities, hearing is tracing, or specimen No  Made a decision to obtain old records No  Reviewed old records Yes  Obtained history from other than patient No    Navneet Carranza was counseled regarding symptoms of thyroid, adrenal, pituitary, PCOS diagnosis, course and complications of disease if inadequately treated, side effects of medications, diagnosis, treatment options, and prognosis, risks, benefits, complications, and alternatives of treatment, labs, imaging and other studies and treatment targets and goals. She understands instructions and counseling. Return in about 3 months (around 11/20/2018) for PCOS, thyroid problems.

## 2018-08-29 DIAGNOSIS — E55.9 VITAMIN D DEFICIENCY: ICD-10-CM

## 2018-08-29 DIAGNOSIS — E22.1 HYPERPROLACTINEMIA (HCC): ICD-10-CM

## 2018-08-29 DIAGNOSIS — E28.2 PCOS (POLYCYSTIC OVARIAN SYNDROME): ICD-10-CM

## 2018-08-29 DIAGNOSIS — E03.9 ACQUIRED HYPOTHYROIDISM: ICD-10-CM

## 2018-08-29 LAB
A/G RATIO: 2 (ref 1.1–2.2)
ALBUMIN SERPL-MCNC: 4.2 G/DL (ref 3.4–5)
ALP BLD-CCNC: 111 U/L (ref 40–129)
ALT SERPL-CCNC: 27 U/L (ref 10–40)
ANION GAP SERPL CALCULATED.3IONS-SCNC: 16 MMOL/L (ref 3–16)
AST SERPL-CCNC: 22 U/L (ref 15–37)
BILIRUB SERPL-MCNC: 0.3 MG/DL (ref 0–1)
BUN BLDV-MCNC: 11 MG/DL (ref 7–20)
CALCIUM SERPL-MCNC: 9.3 MG/DL (ref 8.3–10.6)
CHLORIDE BLD-SCNC: 103 MMOL/L (ref 99–110)
CO2: 23 MMOL/L (ref 21–32)
CREAT SERPL-MCNC: 0.9 MG/DL (ref 0.6–1.1)
GFR AFRICAN AMERICAN: >60
GFR NON-AFRICAN AMERICAN: >60
GLOBULIN: 2.1 G/DL
GLUCOSE BLD-MCNC: 84 MG/DL (ref 70–99)
POTASSIUM SERPL-SCNC: 4.2 MMOL/L (ref 3.5–5.1)
PROLACTIN: 26.4 NG/ML
REASON FOR REJECTION: NORMAL
REASON FOR REJECTION: NORMAL
REJECTED TEST: 8117
REJECTED TEST: NORMAL
SODIUM BLD-SCNC: 142 MMOL/L (ref 136–145)
T3 FREE: 2.2 PG/ML (ref 2.3–4.2)
T4 FREE: 0.8 NG/DL (ref 0.9–1.8)
TOTAL PROTEIN: 6.3 G/DL (ref 6.4–8.2)
TSH SERPL DL<=0.05 MIU/L-ACNC: 3.33 UIU/ML (ref 0.27–4.2)
VITAMIN D 25-HYDROXY: 48.6 NG/ML

## 2018-08-31 LAB
DHEAS (DHEA SULFATE): 320 UG/DL (ref 45–270)
SEX HORMONE BINDING GLOBULIN: 31 NMOL/L (ref 30–135)
TESTOSTERONE FREE-NONMALE: 7.8 PG/ML (ref 1.3–9.2)
TESTOSTERONE TOTAL: 42 NG/DL (ref 20–70)

## 2018-09-01 LAB — CORTISOL SALIVARY: 0.23 UG/DL

## 2018-09-03 PROBLEM — R79.89 ELEVATED CORTISOL LEVEL: Status: ACTIVE | Noted: 2018-09-03

## 2018-09-04 ENCOUNTER — TELEPHONE (OUTPATIENT)
Dept: ENDOCRINOLOGY | Age: 38
End: 2018-09-04

## 2018-09-04 DIAGNOSIS — R79.89 ELEVATED CORTISOL LEVEL: Primary | ICD-10-CM

## 2018-09-04 LAB
CORTISOL SALIVARY: 0.1 UG/DL
CORTISOL SALIVARY: 0.12 UG/DL

## 2018-09-04 NOTE — TELEPHONE ENCOUNTER
Do 24 hour urine for cortisol collection. Await all results, needs nereyda to discuss results and further plan. Thyroid dose not optimal, may increase Charlotte or may discuss during the appointment. Let me know.

## 2018-09-11 DIAGNOSIS — R79.89 ELEVATED CORTISOL LEVEL: ICD-10-CM

## 2018-09-15 LAB
CORTISOL (UR), FREE: 19 UG/D
CORTISOL URINE, FREE (/G CRT): 15.8 UG/G CRT
CORTISOL,F,UG/L,U: 9.48 UG/L
CREATININE 24 HOUR URINE: 1200 MG/D (ref 700–1600)
CREATININE URINE: 60 MG/DL
HOURS COLLECTED: 24 HR
INTERPRETATION, OPI7M: NORMAL
URINE TOTAL VOLUME: 2000 ML

## 2018-09-21 ENCOUNTER — OFFICE VISIT (OUTPATIENT)
Dept: ENDOCRINOLOGY | Age: 38
End: 2018-09-21

## 2018-09-21 VITALS
BODY MASS INDEX: 27.5 KG/M2 | OXYGEN SATURATION: 97 % | DIASTOLIC BLOOD PRESSURE: 76 MMHG | WEIGHT: 175.2 LBS | SYSTOLIC BLOOD PRESSURE: 108 MMHG | HEART RATE: 93 BPM | HEIGHT: 67 IN

## 2018-09-21 DIAGNOSIS — R79.89 ELEVATED CORTISOL LEVEL: ICD-10-CM

## 2018-09-21 DIAGNOSIS — N91.4 SECONDARY OLIGOMENORRHEA: ICD-10-CM

## 2018-09-21 DIAGNOSIS — E22.1 HYPERPROLACTINEMIA (HCC): ICD-10-CM

## 2018-09-21 DIAGNOSIS — E55.9 VITAMIN D DEFICIENCY: ICD-10-CM

## 2018-09-21 DIAGNOSIS — E78.2 MIXED HYPERLIPIDEMIA: ICD-10-CM

## 2018-09-21 DIAGNOSIS — E03.9 ACQUIRED HYPOTHYROIDISM: Primary | ICD-10-CM

## 2018-09-21 DIAGNOSIS — R23.2 FLUSHING: ICD-10-CM

## 2018-09-21 DIAGNOSIS — E27.8 ADRENAL NODULE (HCC): ICD-10-CM

## 2018-09-21 DIAGNOSIS — D64.9 ANEMIA, UNSPECIFIED TYPE: ICD-10-CM

## 2018-09-21 DIAGNOSIS — E04.2 MULTINODULAR GOITER: ICD-10-CM

## 2018-09-21 DIAGNOSIS — E28.2 PCOS (POLYCYSTIC OVARIAN SYNDROME): ICD-10-CM

## 2018-09-21 PROCEDURE — G8417 CALC BMI ABV UP PARAM F/U: HCPCS | Performed by: INTERNAL MEDICINE

## 2018-09-21 PROCEDURE — G8427 DOCREV CUR MEDS BY ELIG CLIN: HCPCS | Performed by: INTERNAL MEDICINE

## 2018-09-21 PROCEDURE — 99215 OFFICE O/P EST HI 40 MIN: CPT | Performed by: INTERNAL MEDICINE

## 2018-09-21 PROCEDURE — 1036F TOBACCO NON-USER: CPT | Performed by: INTERNAL MEDICINE

## 2018-09-21 RX ORDER — DEXAMETHASONE 0.5 MG/1
TABLET ORAL
Qty: 10 TABLET | Refills: 0 | Status: SHIPPED | OUTPATIENT
Start: 2018-09-21 | End: 2018-10-30 | Stop reason: ALTCHOICE

## 2018-09-21 ASSESSMENT — PATIENT HEALTH QUESTIONNAIRE - PHQ9
SUM OF ALL RESPONSES TO PHQ QUESTIONS 1-9: 0
1. LITTLE INTEREST OR PLEASURE IN DOING THINGS: 0
2. FEELING DOWN, DEPRESSED OR HOPELESS: 0
SUM OF ALL RESPONSES TO PHQ9 QUESTIONS 1 & 2: 0
SUM OF ALL RESPONSES TO PHQ QUESTIONS 1-9: 0

## 2018-09-21 NOTE — PROGRESS NOTES
SUBJECTIVE:  Luba Head is a 41 y.o. female who is here for hypothyroidism, hyperprolactinemia, oligomenorrhea, vitamin D, thyroid nodule. 1.  Acquired ypothyroidism    This started in 1996. Patient was diagnosed with thyroid nodules. The problem has been gradually worsening. Patient started medication in 1996. Currently patient is on: [place]. Misses  0 doses a month. Couldn't tolerate Synthroid. Current complaints: fatigue, dry skin, SOB, can't cool down. Fatigue severe, worse. 2. Multinodular Goiter    History of obstructive symptoms: difficulty swallowing No, changes in voice/hoarseness No.  History of radiation to patient's neck: No  Resent iodine exposure: No  Family history includes no thyroid abnormalities. Family history of thyroid cancer: No    3. Hyperprolactinemia (HCC)  On risperidone. Has headaches, severe, takes Imitrex. Same. No galactorrhea. 4. Overweight  Gained 80 lbs for no reason. Had R&Y gastric bypass in 1/2018. Lost 45 lbs. Has purple stria on the breasts. 5. Secondary oligomenorrhea  Has hirsutism. Might be PCOS. Has no period for 3 years. Started periods at age 16. Regular, became irregular after Depo, stopped, then restarted. Missed last one. Not on Depoprovera for 6 years. Has spotting every other month. Has purple stria on the breasts. 6. Vitamin D deficiency  No bone pain    7. Left adrenal nodule  No abdominal pain. Has spells of anxiety, palpitations, flushing, 3 times a week, more frequent,;last 1 hour. 8.  PCOS  Has hirsutism, irregular periods. 10. Hyperlipidemia  No muscle pain    11. Elevated cortisol level  Has easy bruising, reddish stria on the breast, weight gain, difficulty losing weight, abdominal fat, depression, muscle weakness.     EXAMINATION:   CT ABDOMEN WITH AND WITHOUT CONTRAST 4/24/2018 2:01 pm       TECHNIQUE:   CT of the abdomen was performed without and with the administration of   intravenous contrast. Multiplanar Nodules: Multiple small thyroid nodules are seen in each lobe.  These appear   solid and nonsolid.  Within the left thyroid lobe, the largest nodule appears   solid and nonsolid measuring 7 mm x 4 mm x 5 mm.  Largest nodule within the   right thyroid lobe measures 5 mm x 4 mm x 2 mm.  This nodule appears solid.       Cervical lymphadenopathy: No abnormal lymph nodes in the imaged portions of   the neck.           Impression   Multiple small bilateral thyroid nodules are re-demonstrated.  The largest   nodule, within the inferior aspect of the left thyroid lobe, appears not   significantly changed.  Recommend continued prospective surveillance. INDICATION:  History of thyroid nodules, history of hot nodules on   nuclear medicine scan.       COMPARISON:  None available.        FINDINGS:       The right thyroid lobe measures 3.4 x 1.5 x 1.3 cm.  Multiple   small nodules are appreciated, the largest of which measures 4 mm   in the upper aspect of the right thyroid lobe.        The left thyroid lobe measures 3.5 x 1.9 x 1.9 cm. Again, there   are multiple small nodules, the largest of which measures 6 mm in   the lower pole of the left thyroid lobe.         Impression   IMPRESSION:        1. Multiple small subcentimeter nodules within both thyroid lobes. This is consistent with multinodular goiter. Prospective   surveillance of these is recommended. MR brain with and without gadolinium.  Jul 15, 2015 07:05:29 AM:       INDICATION:  \"Hyperprolactinemia ([address] Amenorrhea. \"                                       COMPARISON: None.          FINDINGS:   Ventricles:  Normal for age.           Brain Parenchyma-   · Infratentorial:  Normal.        · Supratentorial:  Normal.               Diffusion Weighted Images:  No abnormal foci of diffusion   restriction.           Postgadolinium Images: Dynamic and post gadolinium sequences are   obtained through the sella turcica.  The enhancement of the   pituitary gland is symmetrical. The pituitary gland is normal in   size. Pituitary stock is midline.                       Extraaxial CSF Spaces:  No abnormal fluid collections.             Parasellar and Vertebrobasilar Arteries:  Normal flow voids.              Visualized Bone Marrow:  Normal.                  Visualized Craniofacial Structures-   · Orbits:  Negative.       · Mastoid air cells:  Negative.                · Paranasal sinuses:  Negative.                             Impression   IMPRESSION:     No acute infarction, mass or hemorrhage is identified.        Normal pituitary gland. No microadenoma is identified.             Normal brain MRI with and without contrast.                         EXAMINATION:   CT OF THE ABDOMEN AND PELVIS WITH CONTRAST 12/10/2016 11:25 pm       TECHNIQUE:   CT of the abdomen and pelvis was performed with the administration of   intravenous contrast. Multiplanar reformatted images are provided for review.    Dose modulation, iterative reconstruction, and/or weight based adjustment of   the mA/kV was utilized to reduce the radiation dose to as low as reasonably   achievable.       COMPARISON:   07/10/2014.       HISTORY:   ORDERING SYSTEM PROVIDED HISTORY: abd pain, gastric sleeve in place   TECHNOLOGIST PROVIDED HISTORY:       Additional Contrast?->None   Ordering Physician Provided Reason for Exam: pain swelling   Acuity: Acute       FINDINGS:   Lower Chest: Dependent changes are seen within the lung bases bilaterally.       Organs: Patient is status post cholecystectomy.  The liver, spleen, pancreas   and right adrenal gland demonstrate no acute abnormality.  A 10 mm left   adrenal nodule is seen.  The kidneys demonstrate symmetric enhancement.  No   focal renal mass or hydronephrosis.       GI/Bowel: A lap band is seen in the region of the proximal stomach.  There is   no evidence of bowel obstruction.  Normal appendix.       Pelvis: No acute abnormality identified of the urinary bladder or present. The bowel gas pattern is nonobstructive. The appendix is normal. Images through  the pelvis demonstrate a normal appearance to the urinary bladder. Nabothian cysts are seen  within the cervix. No focal inflammatory changes or lymphadenopathy seen within the abdomen  or pelvis. Bony structures appear normal for age.      ++ IMPRESSION ++       No acute findings noted within the abdomen or pelvis.      I concur with the preliminary reading. Past Medical History:   Diagnosis Date    ADHD (attention deficit hyperactivity disorder)     Amenorrhea     Anemia     Anxiety     Depression     Frequent headaches     History of colonic polyps     Hyperprolactinemia (HCC)     Hypothyroidism     Mitral valve prolapse     Obstructive sleep apnea     Thyroid nodule     UTI (lower urinary tract infection)     frequent, pyelonephritis     Patient Active Problem List    Diagnosis Date Noted    Elevated cortisol level ([address] 09/03/2018    Mixed hyperlipidemia 08/20/2018    PCOS (polycystic ovarian syndrome) 05/09/2018    Class 1 obesity in adult 04/09/2018    Secondary oligomenorrhea 04/09/2018    Vitamin D deficiency 04/09/2018    Adrenal nodule ([address] 04/09/2018    Flushing 04/09/2018    Hyperprolactinemia ([address]     Hypothyroidism     Multinodular goiter     Anemia      Past Surgical History:   Procedure Laterality Date    EYE SURGERY      x 4    GASTRIC BYPASS SURGERY  01/2018    THYROID SURGERY       History reviewed. No pertinent family history.   Social History     Social History    Marital status:      Spouse name: N/A    Number of children: N/A    Years of education: N/A     Social History Main Topics    Smoking status: Never Smoker    Smokeless tobacco: Never Used    Alcohol use No      Comment: occasionally    Drug use: No    Sexual activity: No     Other Topics Concern    None     Social History Narrative    None     Current Outpatient Prescriptions Systems:  Constitutional: has fatigue, no fever, no recent weight gain, has intentional weight loss after bypass, no changes in appetite  Eyes: no eye pain, has change in vision, no eye redness, no eye irritation, no double vision  Ears, nose, throat: no nasal congestion, no sore throat, no earache, no decrease in hearing, no hoarseness, no dry mouth, no sinus problems, no difficulty swallowing, no neck lumps, no dental problems, no mouth sores, no ringing in ears  Pulmonary: has shortness of breath, has wheezing, no cough  Cardiovascular: no chest pain, has lower extremity edema, has palpitations  Gastrointestinal: no abdominal pain, no nausea, no vomiting, no diarrhea, no constipation, no heartburn, no bloating  Genitourinary: no dysuria, no urinary incontinence, no urinary hesitancy, no change in urinary frequency, no feelings of urinary urgency, no nocturia  Musculoskeletal: no joint swelling, has joint stiffness, has joint pain, no muscle cramps, no muscle pain, no bone pain  Integument/Breast: has hair loss, no skin rashes, no skin lesions, has itching, has dry skin, no breast pain, no breast mass, no skin hives, no skin discoloration, no nipple discharge, has stria  Neurological: no numbness, no tingling, no weakness, no confusion, has headaches, has dizziness, no fainting, no tremors, no decrease in memory, no balance problems  Psychiatric: has anxiety, has depression, has insomnia  Hematologic/Lymphatic: no tendency for easy bleeding, no swollen lymph nodes, has tendency for easy bruising  Immunology: no seasonal allergies, no frequent infections, has frequent illnesses  Endocrine: no temperature intolerance, no hot flashes, has hand tremor    OBJECTIVE:   /76 (Site: Left Upper Arm, Position: Sitting, Cuff Size: Medium Adult)   Pulse 93   Ht 5' 7\" (1.702 m)   Wt 175 lb 3.2 oz (79.5 kg)   SpO2 97%   BMI 27.44 kg/m²   Wt Readings from Last 3 Encounters:   09/28/18 175 lb (79.4 kg)   09/25/18 175 lb (79.4 kg)   09/21/18 175 lb 3.2 oz (79.5 kg)       Physical Exam:  Constitutional: no acute distress, well appearing, well nourished  Psychiatric: oriented to person, place and time, judgement, insight and normal, recent and remote memory and intact and mood, affect are normal  Skin: skin and subcutaneous tissue is normal without mass, normal turgor, Has purple stria on the breasts  Head and Face: examination of head and face revealed no abnormalities  Eyes: no lid or conjunctival swelling, no erythema or discharge, pupils are normal, equal, round, and reactive to light  Ears/Nose: external inspection of ears and nose revealed no abnormalities, hearing is grossly normal  Oropharynx/Mouth/Face: lips, tongue and gums are normal with no lesions, the voice quality was normal  Neck: neck is supple and symmetric, with midline trachea and no masses, thyroid is enlarged  Lymphatics: normal cervical lymph nodes, normal supraclavicular nodes  Pulmonary: no increased work of breathing or signs of respiratory distress, lungs are clear to auscultation  Cardiovascular: normal heart rate and rhythm, normal S1 and S2, no murmurs and pedal pulses and 2+ bilaterally, 1+ edema  Abdomen: abdomen is soft, non-tender with no masses  Musculoskeletal: normal gait and station, exam of the digits and nails are normal  Neurological: normal coordination, normal general cortical function    Lab Review:  Lab Results   Component Value Date    TSH 3.33 08/29/2018     No results found for: FREET4     ASSESSMENT/PLAN:  1. Acquired hypothyroidism  Increase [place] to 75 mg.  - ARMOUR THYROID 60 MG tablet; TAKE 1 TABLET BY MOUTH EVERY DAY    - T3, Free; Future  - T3; Future  - T4; Future      2. Multinodular goiter    - US Head Neck Soft Tissue Thyroid; Future    3. Hyperprolactinemia (HCC)    - Prolactin; Future    4. Overweight  Difficult to lose weight now. Diet, exercise. 5. Secondary oligomenorrhea    - Prolactin; Future    6.  Vitamin D

## 2018-09-25 ENCOUNTER — OFFICE VISIT (OUTPATIENT)
Dept: INTERNAL MEDICINE CLINIC | Age: 38
End: 2018-09-25
Payer: MEDICARE

## 2018-09-25 VITALS
BODY MASS INDEX: 27.47 KG/M2 | WEIGHT: 175 LBS | OXYGEN SATURATION: 95 % | TEMPERATURE: 99.3 F | HEART RATE: 99 BPM | DIASTOLIC BLOOD PRESSURE: 70 MMHG | SYSTOLIC BLOOD PRESSURE: 118 MMHG | HEIGHT: 67 IN

## 2018-09-25 DIAGNOSIS — R11.11 VOMITING WITHOUT NAUSEA, INTRACTABILITY OF VOMITING NOT SPECIFIED, UNSPECIFIED VOMITING TYPE: ICD-10-CM

## 2018-09-25 DIAGNOSIS — R51.9 NONINTRACTABLE HEADACHE, UNSPECIFIED CHRONICITY PATTERN, UNSPECIFIED HEADACHE TYPE: Primary | ICD-10-CM

## 2018-09-25 DIAGNOSIS — R10.10 PAIN OF UPPER ABDOMEN: ICD-10-CM

## 2018-09-25 DIAGNOSIS — J34.89 SINUS PRESSURE: ICD-10-CM

## 2018-09-25 LAB
BASOPHILS ABSOLUTE: 0.1 K/UL (ref 0–0.2)
BASOPHILS RELATIVE PERCENT: 0.9 %
BILIRUBIN, POC: NORMAL
BLOOD URINE, POC: NORMAL
CLARITY, POC: CLEAR
COLOR, POC: YELLOW
EOSINOPHILS ABSOLUTE: 0.3 K/UL (ref 0–0.6)
EOSINOPHILS RELATIVE PERCENT: 2.4 %
GLUCOSE URINE, POC: NORMAL
HCT VFR BLD CALC: 39 % (ref 36–48)
HEMOGLOBIN: 12.6 G/DL (ref 12–16)
KETONES, POC: NORMAL
LEUKOCYTE EST, POC: NORMAL
LYMPHOCYTES ABSOLUTE: 3.4 K/UL (ref 1–5.1)
LYMPHOCYTES RELATIVE PERCENT: 28.2 %
MCH RBC QN AUTO: 27.2 PG (ref 26–34)
MCHC RBC AUTO-ENTMCNC: 32.4 G/DL (ref 31–36)
MCV RBC AUTO: 84 FL (ref 80–100)
MONOCYTES ABSOLUTE: 1 K/UL (ref 0–1.3)
MONOCYTES RELATIVE PERCENT: 8.1 %
NEUTROPHILS ABSOLUTE: 7.2 K/UL (ref 1.7–7.7)
NEUTROPHILS RELATIVE PERCENT: 60.4 %
NITRITE, POC: NORMAL
PDW BLD-RTO: 15.5 % (ref 12.4–15.4)
PH, POC: 5.5
PLATELET # BLD: 376 K/UL (ref 135–450)
PMV BLD AUTO: 8.1 FL (ref 5–10.5)
PROTEIN, POC: NORMAL
RBC # BLD: 4.64 M/UL (ref 4–5.2)
SPECIFIC GRAVITY, POC: 1.03
UROBILINOGEN, POC: 0.2
WBC # BLD: 11.9 K/UL (ref 4–11)

## 2018-09-25 PROCEDURE — 81002 URINALYSIS NONAUTO W/O SCOPE: CPT | Performed by: INTERNAL MEDICINE

## 2018-09-25 PROCEDURE — 99214 OFFICE O/P EST MOD 30 MIN: CPT | Performed by: INTERNAL MEDICINE

## 2018-09-25 RX ORDER — PROMETHAZINE HYDROCHLORIDE 25 MG/1
25 TABLET ORAL EVERY 6 HOURS PRN
Qty: 20 TABLET | Refills: 0 | Status: SHIPPED | OUTPATIENT
Start: 2018-09-25 | End: 2018-10-30 | Stop reason: SDUPTHER

## 2018-09-25 RX ORDER — GUAIFENESIN, PSEUDOEPHEDRINE HYDROCHLORIDE 600; 60 MG/1; MG/1
1 TABLET, EXTENDED RELEASE ORAL 2 TIMES DAILY PRN
Qty: 18 TABLET | Refills: 0 | Status: SHIPPED | OUTPATIENT
Start: 2018-09-25

## 2018-09-25 NOTE — PROGRESS NOTES
Anh Powers   YOB: 1980    Date of Visit:  9/25/2018    Chief Complaint   Patient presents with    Headache    Other     sweating    Flank Pain       HPI  Pt c/o headache x 3 days. Pt states it feels likes sinus pressure. Pt states Flonase and sudafed are not helping. Headache is dull achy and intermittent. Headache 8 out of 10 severity. Pt has taken Imitrex, Sudafed, Flonase, Tylenol, and tung pot. Pt pain right and left abdomen for 3 days. Abdominal pain is dull achy and intermittent. Pt states she vomited a few 3 days ago but none since. Pt states she vomited fluids she wasn't able to keep down. Pt denies nausea. Pt states she has been breaking out in sweats. Pt has been taking Tylenol 2 tablets every 6hrs     Pt states she was diagnosed with Cushing's Syndrome last week    Pt states she has been sleeping constantly the past 3 days. Metformin increased last week and pt just started increase over the weekend    Pt can't take Ibuprofen due to gastric bypass surgery    Review of Systems   Constitutional: Positive for fatigue. Negative for chills and fever. HENT: Negative for congestion, ear pain, postnasal drip, rhinorrhea, sneezing and sore throat. Eyes: Negative for visual disturbance. Respiratory: Negative for cough, chest tightness, shortness of breath and wheezing. Cardiovascular: Negative for chest pain, palpitations and leg swelling. Gastrointestinal: Positive for abdominal pain and vomiting. Negative for constipation, diarrhea and nausea. Genitourinary: Negative for dysuria and frequency. Neurological: Positive for headaches. Negative for dizziness and light-headedness.        Allergies   Allergen Reactions    Bactrim [Sulfamethoxazole-Trimethoprim] Nausea And Vomiting    Naproxen Nausea And Vomiting     Outpatient Prescriptions Marked as Taking for the 9/25/18 encounter (Office Visit) with Kim Tena MD   Medication Sig Dispense Refill     DHEAS (DHEA Sulfate) 08/29/2018 320.0*    Testosterone 08/29/2018 42     Sex Hormone Binding 08/29/2018 31     Testosterone, Free 08/29/2018 7.8     Prolactin 08/29/2018 26.4     Vit D, 25-Hydroxy 08/29/2018 48.6     Cortisol, Saliva 08/26/2018 0.226     Cortisol, Saliva 08/28/2018 0.104     Cortisol, Saliva 08/27/2018 0.119     Rejected Test 08/29/2018 8117     Reason for Rejection 08/29/2018 last names Rosie Porter Rejected Test 08/29/2018 [id number]     Reason for Rejection 08/29/2018 pt names dont          Assessment/Plan     1. Nonintractable headache, unspecified chronicity pattern, unspecified headache type  -Tylenol 650mg 3 times daily as needed  -Mucinex D 1 tablet every 12 hours    2. Sinus pressure  - pseudoephedrine-guaiFENesin (MUCINEX D)  MG per extended release tablet; Take 1 tablet by mouth 2 times daily as needed (sinus pressure)  Dispense: 18 tablet; Refill: 0    3. Pain of upper abdomen  - POCT Urinalysis no Micro  - Comprehensive Metabolic Panel; Future  - CBC Auto Differential; Future  - US ABDOMEN COMPLETE; Future    4. Vomiting without nausea, intractability of vomiting not specified, unspecified vomiting type    - POCT Urinalysis no Micro  - Comprehensive Metabolic Panel; Future  - CBC Auto Differential; Future  - US ABDOMEN COMPLETE; Future  - promethazine (PHENERGAN) 25 MG tablet; Take 1 tablet by mouth every 6 hours as needed for Nausea (vomiting)  Dispense: 20 tablet; Refill: 0  -increase fluids      Discussed medications with patient, who voiced understanding of their use and indications. All questions answered. Return in about 3 days (around 9/28/2018) for abdominal pain, headache, and results.

## 2018-09-26 ENCOUNTER — E-VISIT (OUTPATIENT)
Dept: FAMILY MEDICINE CLINIC | Age: 38
End: 2018-09-26
Payer: MEDICARE

## 2018-09-26 ENCOUNTER — TELEPHONE (OUTPATIENT)
Dept: INTERNAL MEDICINE CLINIC | Age: 38
End: 2018-09-26

## 2018-09-26 DIAGNOSIS — G43.811 OTHER MIGRAINE WITH STATUS MIGRAINOSUS, INTRACTABLE: Primary | ICD-10-CM

## 2018-09-26 LAB
A/G RATIO: 1.5 (ref 1.1–2.2)
ALBUMIN SERPL-MCNC: 4 G/DL (ref 3.4–5)
ALP BLD-CCNC: 130 U/L (ref 40–129)
ALT SERPL-CCNC: 25 U/L (ref 10–40)
ANION GAP SERPL CALCULATED.3IONS-SCNC: 15 MMOL/L (ref 3–16)
AST SERPL-CCNC: 19 U/L (ref 15–37)
BILIRUB SERPL-MCNC: <0.2 MG/DL (ref 0–1)
BUN BLDV-MCNC: 6 MG/DL (ref 7–20)
CALCIUM SERPL-MCNC: 9.3 MG/DL (ref 8.3–10.6)
CHLORIDE BLD-SCNC: 101 MMOL/L (ref 99–110)
CO2: 24 MMOL/L (ref 21–32)
CREAT SERPL-MCNC: 0.8 MG/DL (ref 0.6–1.1)
GFR AFRICAN AMERICAN: >60
GFR NON-AFRICAN AMERICAN: >60
GLOBULIN: 2.7 G/DL
GLUCOSE BLD-MCNC: 86 MG/DL (ref 70–99)
POTASSIUM SERPL-SCNC: 4.4 MMOL/L (ref 3.5–5.1)
SODIUM BLD-SCNC: 140 MMOL/L (ref 136–145)
TOTAL PROTEIN: 6.7 G/DL (ref 6.4–8.2)

## 2018-09-26 PROCEDURE — 99444 PR PHYSICIAN ONLINE EVALUATION & MANAGEMENT SERVICE: CPT | Performed by: FAMILY MEDICINE

## 2018-09-26 NOTE — TELEPHONE ENCOUNTER
Patient said headache not better  She tried the mucinex d  You said you woul call in something else   Fever   99.5

## 2018-09-26 NOTE — TELEPHONE ENCOUNTER
Pt called in, stating that she took her last dose of Mucinex at around 7am today (2nd of 2 doses that she's taken since yesterday). Pt is still experiencing headaches & the pain is fairly debilitating - does not have any other symptoms related to yesterday's OV with Dr. Mil Soto. Pt was unsure of what she should do next or if there is anything else that could be done about this; she feels these are tension heachaches.     Please follow up with pt at [phone]

## 2018-09-27 ASSESSMENT — ENCOUNTER SYMPTOMS
DIARRHEA: 0
CHEST TIGHTNESS: 0
WHEEZING: 0
SORE THROAT: 0
COUGH: 0
RHINORRHEA: 0
VOMITING: 1
SHORTNESS OF BREATH: 0
NAUSEA: 0
ABDOMINAL PAIN: 1
CONSTIPATION: 0

## 2018-09-28 ENCOUNTER — HOSPITAL ENCOUNTER (EMERGENCY)
Age: 38
Discharge: HOME | End: 2018-09-29
Attending: EMERGENCY MEDICINE
Payer: MEDICARE

## 2018-09-28 DIAGNOSIS — G43.009 MIGRAINE WITHOUT AURA AND WITHOUT STATUS MIGRAINOSUS, NOT INTRACTABLE: Primary | ICD-10-CM

## 2018-09-28 PROCEDURE — 99283 EMERGENCY DEPT VISIT LOW MDM: CPT

## 2018-09-28 RX ORDER — KETOROLAC TROMETHAMINE 30 MG/ML
15 INJECTION, SOLUTION INTRAMUSCULAR; INTRAVENOUS ONCE
Status: COMPLETED | OUTPATIENT
Start: 2018-09-29 | End: 2018-09-29

## 2018-09-28 RX ORDER — DIPHENHYDRAMINE HYDROCHLORIDE 50 MG/ML
25 INJECTION INTRAMUSCULAR; INTRAVENOUS ONCE
Status: COMPLETED | OUTPATIENT
Start: 2018-09-29 | End: 2018-09-29

## 2018-09-28 ASSESSMENT — PAIN DESCRIPTION - DESCRIPTORS: DESCRIPTORS: ACHING

## 2018-09-28 ASSESSMENT — PAIN DESCRIPTION - LOCATION: LOCATION: HEAD

## 2018-09-28 ASSESSMENT — PAIN SCALES - GENERAL: PAINLEVEL_OUTOF10: 9

## 2018-09-28 ASSESSMENT — PAIN DESCRIPTION - PAIN TYPE: TYPE: ACUTE PAIN

## 2018-09-29 VITALS
HEART RATE: 84 BPM | DIASTOLIC BLOOD PRESSURE: 65 MMHG | SYSTOLIC BLOOD PRESSURE: 101 MMHG | OXYGEN SATURATION: 94 % | RESPIRATION RATE: 17 BRPM | HEIGHT: 67 IN | TEMPERATURE: 98.1 F | BODY MASS INDEX: 27.47 KG/M2 | WEIGHT: 175 LBS

## 2018-09-29 PROCEDURE — 6360000002 HC RX W HCPCS: Performed by: PHYSICIAN ASSISTANT

## 2018-09-29 PROCEDURE — 96372 THER/PROPH/DIAG INJ SC/IM: CPT

## 2018-09-29 PROCEDURE — 96374 THER/PROPH/DIAG INJ IV PUSH: CPT

## 2018-09-29 PROCEDURE — 96375 TX/PRO/DX INJ NEW DRUG ADDON: CPT

## 2018-09-29 RX ORDER — HALOPERIDOL 5 MG/ML
5 INJECTION INTRAMUSCULAR ONCE
Status: COMPLETED | OUTPATIENT
Start: 2018-09-29 | End: 2018-09-29

## 2018-09-29 RX ORDER — ONDANSETRON 2 MG/ML
8 INJECTION INTRAMUSCULAR; INTRAVENOUS ONCE
Status: COMPLETED | OUTPATIENT
Start: 2018-09-29 | End: 2018-09-29

## 2018-09-29 RX ADMIN — HALOPERIDOL LACTATE 5 MG: 5 INJECTION, SOLUTION INTRAMUSCULAR at 01:50

## 2018-09-29 RX ADMIN — DIPHENHYDRAMINE HYDROCHLORIDE 25 MG: 50 INJECTION, SOLUTION INTRAMUSCULAR; INTRAVENOUS at 00:23

## 2018-09-29 RX ADMIN — KETOROLAC TROMETHAMINE 15 MG: 30 INJECTION, SOLUTION INTRAMUSCULAR at 00:23

## 2018-09-29 RX ADMIN — PROCHLORPERAZINE EDISYLATE 10 MG: 5 INJECTION INTRAMUSCULAR; INTRAVENOUS at 00:23

## 2018-09-29 RX ADMIN — ONDANSETRON 8 MG: 2 INJECTION, SOLUTION INTRAMUSCULAR; INTRAVENOUS at 00:23

## 2018-09-29 ASSESSMENT — PAIN SCALES - GENERAL: PAINLEVEL_OUTOF10: 7

## 2018-09-29 NOTE — ED PROVIDER NOTES
R-0Normal      dexamethasone (DECADRON) 0.5 MG tablet Use as directed for testing, total 10 tablets, Disp-10 tablet, R-0Normal      metFORMIN (GLUCOPHAGE) 500 MG tablet Take 1 tablet by mouth 3 times daily (with meals), Disp-270 tablet, R-1Normal      SUMAtriptan (IMITREX) 100 MG tablet TAKE 1 TABLET BY MOUTH ONCE AS NEEDED FOR MIGRAINE. TAKE 1 TABLET AT ONSET OF MIGRAINE AND MAY REPEAT IN 2 HOURS TO A MAX OF 2 PER 24 HOURS, Disp-9 tablet, R-0Normal      risperiDONE (RISPERDAL) 2 MG tablet Take 1 tablet by mouth nightly, Disp-60 tablet, R-0Adjust Sig      ARMOUR THYROID 60 MG tablet TAKE 1 TABLET BY MOUTH EVERY DAY, Disp-90 tablet, R-0, DAWNormal      VENTOLIN  (90 Base) MCG/ACT inhaler INHALE 2 PUFFS INTO THE LUNGS EVERY 6 HOURS AS NEEDED FOR WHEEZING AND SHORTNESS OF BREATH, Disp-18 g, R-0Normal      dicyclomine (BENTYL) 10 MG capsule Take 1 capsule by mouth every 6 hours as needed (cramps), Disp-20 capsule, R-0      Venlafaxine HCl (EFFEXOR PO) Take by mouth      buPROPion (WELLBUTRIN XL) 300 MG XL tablet   Take 300 mg by mouth every morning       zolpidem (AMBIEN) 10 MG tablet   Take 10 mg by mouth nightly as needed       clonazePAM (KLONOPIN) 2 MG tablet Take 2 mg by mouth 3 times daily as needed for Anxiety. Allergies     She is allergic to bactrim [sulfamethoxazole-trimethoprim] and naproxen. Physical Exam     INITIAL VITALS: BP: 117/67, Temp: 98.1 °F (36.7 °C), Pulse: 96, Resp: 16, SpO2: 96 %   Physical Exam   Constitutional: She is oriented to person, place, and time. She appears well-developed and well-nourished. No distress. HENT:   Head: Normocephalic and atraumatic. Eyes: Pupils are equal, round, and reactive to light. Neck: Normal range of motion. Neck supple. Cardiovascular: Normal rate, regular rhythm and normal heart sounds. Exam reveals no gallop and no friction rub. No murmur heard. Pulmonary/Chest: Effort normal and breath sounds normal. No respiratory distress.

## 2018-09-29 NOTE — ED NOTES
IV removed. Patient states she has a ride home. Patient prepared for and ready to be discharged. Patient discharged at this time in no acute distress after verbalizing understanding of discharge instructions. Patient left after receiving After Visit Summary instructions.         Karrie Todd RN  09/29/18 [id number]

## 2018-10-01 ENCOUNTER — TELEPHONE (OUTPATIENT)
Dept: INTERNAL MEDICINE CLINIC | Age: 38
End: 2018-10-01

## 2018-10-01 ENCOUNTER — TELEPHONE (OUTPATIENT)
Dept: ENDOCRINOLOGY | Age: 38
End: 2018-10-01

## 2018-10-01 DIAGNOSIS — E03.9 ACQUIRED HYPOTHYROIDISM: ICD-10-CM

## 2018-10-01 LAB — CORTISOL - AM: 14 UG/DL (ref 4.3–22.4)

## 2018-10-01 RX ORDER — LEVOTHYROXINE AND LIOTHYRONINE 9.5; 2.25 UG/1; UG/1
15 TABLET ORAL DAILY
Qty: 30 TABLET | Refills: 3 | Status: SHIPPED | OUTPATIENT
Start: 2018-10-01 | End: 2018-10-01 | Stop reason: SDUPTHER

## 2018-10-01 RX ORDER — THYROID,PORK 15 MG
15 TABLET ORAL DAILY
Qty: 30 TABLET | Refills: 3 | Status: SHIPPED | OUTPATIENT
Start: 2018-10-01 | End: 2018-10-12 | Stop reason: SDUPTHER

## 2018-10-02 ENCOUNTER — TELEPHONE (OUTPATIENT)
Dept: ENDOCRINOLOGY | Age: 38
End: 2018-10-02

## 2018-10-02 DIAGNOSIS — R79.89 ELEVATED CORTISOL LEVEL: ICD-10-CM

## 2018-10-02 LAB — CORTISOL - AM: 0.9 UG/DL (ref 4.3–22.4)

## 2018-10-03 LAB — ADRENOCORTICOTROPIC HORMONE: 26 PG/ML (ref 6–58)

## 2018-10-04 ENCOUNTER — TELEPHONE (OUTPATIENT)
Dept: ENDOCRINOLOGY | Age: 38
End: 2018-10-04

## 2018-10-04 ENCOUNTER — E-VISIT (OUTPATIENT)
Dept: INTERNAL MEDICINE CLINIC | Age: 38
End: 2018-10-04
Payer: MEDICARE

## 2018-10-04 DIAGNOSIS — G43.009 MIGRAINE WITHOUT AURA AND WITHOUT STATUS MIGRAINOSUS, NOT INTRACTABLE: Primary | ICD-10-CM

## 2018-10-04 DIAGNOSIS — E03.9 ACQUIRED HYPOTHYROIDISM: ICD-10-CM

## 2018-10-04 DIAGNOSIS — R79.89 ELEVATED CORTISOL LEVEL: ICD-10-CM

## 2018-10-04 LAB
ADRENOCORTICOTROPIC HORMONE: 6 PG/ML (ref 6–58)
CORTISOL - AM: <0.8 UG/DL (ref 4.3–22.4)
T3 FREE: 2.4 PG/ML (ref 2.3–4.2)
T4 FREE: 0.9 NG/DL (ref 0.9–1.8)
TSH SERPL DL<=0.05 MIU/L-ACNC: 0.57 UIU/ML (ref 0.27–4.2)

## 2018-10-04 PROCEDURE — 99444 PR PHYSICIAN ONLINE EVALUATION & MANAGEMENT SERVICE: CPT | Performed by: INTERNAL MEDICINE

## 2018-10-04 RX ORDER — RIZATRIPTAN BENZOATE 10 MG/1
10 TABLET ORAL
Qty: 12 TABLET | Refills: 0 | Status: SHIPPED | OUTPATIENT
Start: 2018-10-04 | End: 2018-10-30

## 2018-10-04 RX ORDER — MULTIVITAMIN WITH IRON
250 TABLET ORAL DAILY
Qty: 30 TABLET | Refills: 0 | Status: SHIPPED | OUTPATIENT
Start: 2018-10-04

## 2018-10-08 DIAGNOSIS — R79.89 ELEVATED CORTISOL LEVEL: ICD-10-CM

## 2018-10-08 LAB — ADRENOCORTICOTROPIC HORMONE: <5 PG/ML (ref 6–58)

## 2018-10-09 ENCOUNTER — OFFICE VISIT (OUTPATIENT)
Dept: INTERNAL MEDICINE CLINIC | Age: 38
End: 2018-10-09
Payer: MEDICARE

## 2018-10-09 VITALS
OXYGEN SATURATION: 98 % | DIASTOLIC BLOOD PRESSURE: 70 MMHG | BODY MASS INDEX: 27.12 KG/M2 | WEIGHT: 172.8 LBS | HEIGHT: 67 IN | HEART RATE: 105 BPM | SYSTOLIC BLOOD PRESSURE: 100 MMHG

## 2018-10-09 DIAGNOSIS — R51.9 PERSISTENT HEADACHES: Primary | ICD-10-CM

## 2018-10-09 DIAGNOSIS — G43.009 MIGRAINE WITHOUT AURA AND WITHOUT STATUS MIGRAINOSUS, NOT INTRACTABLE: ICD-10-CM

## 2018-10-09 DIAGNOSIS — R20.0 LIP NUMBNESS: ICD-10-CM

## 2018-10-09 DIAGNOSIS — D72.829 LEUKOCYTOSIS, UNSPECIFIED TYPE: ICD-10-CM

## 2018-10-09 PROCEDURE — 1036F TOBACCO NON-USER: CPT | Performed by: INTERNAL MEDICINE

## 2018-10-09 PROCEDURE — 99214 OFFICE O/P EST MOD 30 MIN: CPT | Performed by: INTERNAL MEDICINE

## 2018-10-09 PROCEDURE — G8417 CALC BMI ABV UP PARAM F/U: HCPCS | Performed by: INTERNAL MEDICINE

## 2018-10-09 PROCEDURE — G8484 FLU IMMUNIZE NO ADMIN: HCPCS | Performed by: INTERNAL MEDICINE

## 2018-10-09 PROCEDURE — G8427 DOCREV CUR MEDS BY ELIG CLIN: HCPCS | Performed by: INTERNAL MEDICINE

## 2018-10-09 RX ORDER — TRAMADOL HYDROCHLORIDE 50 MG/1
50 TABLET ORAL 3 TIMES DAILY PRN
Qty: 9 TABLET | Refills: 0 | Status: SHIPPED | OUTPATIENT
Start: 2018-10-09 | End: 2018-10-12

## 2018-10-09 ASSESSMENT — ENCOUNTER SYMPTOMS
COUGH: 0
SINUS PAIN: 0
CHEST TIGHTNESS: 0
SORE THROAT: 0
SHORTNESS OF BREATH: 0
SINUS PRESSURE: 0
WHEEZING: 0
RHINORRHEA: 0

## 2018-10-09 NOTE — PROGRESS NOTES
Laine Thompson   YOB: 1980    Date of Visit:  10/9/2018    Chief Complaint   Patient presents with    Migraine     Follow Up       HPI  Migraine- takes the edge off short term repeats it in 2 hours multiple times in the same day. Pt is taking magnesium     Headache wakes her up at night and in the middle of the morning. Pt is throbbing and constant. Pt has had the headache for a couple of weeks. 5 out of 10 severity and increases to 8 out of 10 severity    Pt states she has had a headache daily  Headache eases up and gets worse again off and during the day  A lot of nausea  Pressure behind eyes    Lips tingle top and bottom with headache    Pt states one of her eyes drifts when she has a headache  Pt has a stiff neck with the headache    Review of Systems   Constitutional: Negative for chills, fatigue and fever. HENT: Negative for congestion, ear pain, postnasal drip, rhinorrhea, sinus pain, sinus pressure, sneezing and sore throat. Eyes: Positive for visual disturbance. Respiratory: Negative for cough, chest tightness, shortness of breath and wheezing. Cardiovascular: Negative for chest pain, palpitations and leg swelling. Gastrointestinal: Negative for abdominal pain, blood in stool, constipation, diarrhea, nausea and vomiting. Genitourinary: Negative for dysuria, frequency and hematuria. Musculoskeletal: Negative for arthralgias and myalgias. Skin: Negative for rash. Neurological: Positive for dizziness, light-headedness, numbness and headaches. Negative for tremors, syncope and weakness. Psychiatric/Behavioral: Negative for dysphoric mood and sleep disturbance. The patient is not nervous/anxious.         Allergies   Allergen Reactions    Bactrim [Sulfamethoxazole-Trimethoprim] Nausea And Vomiting    Naproxen Nausea And Vomiting     Outpatient Prescriptions Marked as Taking for the 10/9/18 encounter (Office Visit) with Maverick Palm MD   Medication Sig Dispense

## 2018-10-11 LAB
CORTISOL SALIVARY: 0.06 UG/DL
CORTISOL SALIVARY: 0.1 UG/DL

## 2018-10-12 ENCOUNTER — OFFICE VISIT (OUTPATIENT)
Dept: ENDOCRINOLOGY | Age: 38
End: 2018-10-12
Payer: MEDICARE

## 2018-10-12 VITALS
OXYGEN SATURATION: 96 % | WEIGHT: 174.2 LBS | HEIGHT: 67 IN | DIASTOLIC BLOOD PRESSURE: 69 MMHG | SYSTOLIC BLOOD PRESSURE: 106 MMHG | BODY MASS INDEX: 27.34 KG/M2 | HEART RATE: 136 BPM

## 2018-10-12 DIAGNOSIS — E03.9 ACQUIRED HYPOTHYROIDISM: Primary | ICD-10-CM

## 2018-10-12 DIAGNOSIS — E89.0 POSTOPERATIVE HYPOTHYROIDISM: ICD-10-CM

## 2018-10-12 DIAGNOSIS — E66.9 CLASS 1 OBESITY WITH SERIOUS COMORBIDITY AND BODY MASS INDEX (BMI) OF 30.0 TO 30.9 IN ADULT, UNSPECIFIED OBESITY TYPE: ICD-10-CM

## 2018-10-12 DIAGNOSIS — R79.89 ELEVATED CORTISOL LEVEL: ICD-10-CM

## 2018-10-12 DIAGNOSIS — E55.9 VITAMIN D DEFICIENCY: ICD-10-CM

## 2018-10-12 DIAGNOSIS — E27.8 ADRENAL NODULE (HCC): ICD-10-CM

## 2018-10-12 DIAGNOSIS — E78.2 MIXED HYPERLIPIDEMIA: ICD-10-CM

## 2018-10-12 DIAGNOSIS — N91.4 SECONDARY OLIGOMENORRHEA: ICD-10-CM

## 2018-10-12 DIAGNOSIS — E22.1 HYPERPROLACTINEMIA (HCC): ICD-10-CM

## 2018-10-12 DIAGNOSIS — E04.2 MULTINODULAR GOITER: ICD-10-CM

## 2018-10-12 DIAGNOSIS — E28.2 PCOS (POLYCYSTIC OVARIAN SYNDROME): ICD-10-CM

## 2018-10-12 PROCEDURE — 1036F TOBACCO NON-USER: CPT | Performed by: INTERNAL MEDICINE

## 2018-10-12 PROCEDURE — G8417 CALC BMI ABV UP PARAM F/U: HCPCS | Performed by: INTERNAL MEDICINE

## 2018-10-12 PROCEDURE — G8484 FLU IMMUNIZE NO ADMIN: HCPCS | Performed by: INTERNAL MEDICINE

## 2018-10-12 PROCEDURE — G8427 DOCREV CUR MEDS BY ELIG CLIN: HCPCS | Performed by: INTERNAL MEDICINE

## 2018-10-12 PROCEDURE — 99214 OFFICE O/P EST MOD 30 MIN: CPT | Performed by: INTERNAL MEDICINE

## 2018-10-12 RX ORDER — THYROID,PORK 15 MG
15 TABLET ORAL DAILY
Qty: 30 TABLET | Refills: 3 | Status: SHIPPED | OUTPATIENT
Start: 2018-10-12 | End: 2019-01-11 | Stop reason: SDUPTHER

## 2018-10-12 RX ORDER — THYROID 60 MG
TABLET ORAL
Qty: 30 TABLET | Refills: 3 | Status: SHIPPED | OUTPATIENT
Start: 2018-10-12 | End: 2019-01-11 | Stop reason: SDUPTHER

## 2018-10-12 NOTE — PROGRESS NOTES
SUBJECTIVE:  Jared Yung is a 41 y.o. female who is here for hypothyroidism, hyperprolactinemia, oligomenorrhea, vitamin D, thyroid nodule. 1.  Acquired ypothyroidism    This started in 1996. Patient was diagnosed with thyroid nodules. The problem has been gradually worsening. Patient started medication in 1996. Currently patient is on: [place]. Misses  0 doses a month. Couldn't tolerate Synthroid. Current complaints: fatigue, dry skin, SOB, can't cool down, headaches. Fatigue severe, worse. 2. Multinodular Goiter  At age 14 had partial thyroid surgery. History of obstructive symptoms: difficulty swallowing No, changes in voice/hoarseness No.  History of radiation to patient's neck: No  Resent iodine exposure: No  Family history includes no thyroid abnormalities. Family history of thyroid cancer: No    3. Hyperprolactinemia (HCC)  On risperidone. Has headaches, severe, takes Imitrex. Same. No galactorrhea. 4. Overweight  Gained 80 lbs for no reason. Had R&Y gastric bypass in 1/2018. Lost 70 lbs. Has purple stria on the breasts. 5. Secondary oligomenorrhea  Has hirsutism. Might be PCOS. Has no period for 3 years. Started periods at age 16. Regular, became irregular after Depo, stopped, then restarted. Missed last one. Not on Depoprovera for 6 years. Has spotting every other month. Has purple stria on the breasts. 6. Vitamin D deficiency  No bone pain    7. Left adrenal nodule  No abdominal pain. Has spells of anxiety, palpitations, flushing, 3 times a week, less frequent, twice a month. 8.  PCOS  Has hirsutism, irregular periods. 10. Hyperlipidemia  No muscle pain    11. Elevated cortisol level  Has easy bruising, reddish stria on the breast, weight gain, difficulty losing weight, abdominal fat, depression, muscle weakness.     EXAMINATION:   CT ABDOMEN WITH AND WITHOUT CONTRAST 4/24/2018 2:01 pm       TECHNIQUE:   CT of the abdomen was performed without and with the sella turcica. The enhancement of the   pituitary gland is symmetrical. The pituitary gland is normal in   size. Pituitary stock is midline.                       Extraaxial CSF Spaces:  No abnormal fluid collections.             Parasellar and Vertebrobasilar Arteries:  Normal flow voids.              Visualized Bone Marrow:  Normal.                  Visualized Craniofacial Structures-   · Orbits:  Negative.       · Mastoid air cells:  Negative.                · Paranasal sinuses:  Negative.                             Impression   IMPRESSION:     No acute infarction, mass or hemorrhage is identified.        Normal pituitary gland. No microadenoma is identified.             Normal brain MRI with and without contrast.                         EXAMINATION:   CT OF THE ABDOMEN AND PELVIS WITH CONTRAST 12/10/2016 11:25 pm       TECHNIQUE:   CT of the abdomen and pelvis was performed with the administration of   intravenous contrast. Multiplanar reformatted images are provided for review.    Dose modulation, iterative reconstruction, and/or weight based adjustment of   the mA/kV was utilized to reduce the radiation dose to as low as reasonably   achievable.       COMPARISON:   07/10/2014.       HISTORY:   ORDERING SYSTEM PROVIDED HISTORY: abd pain, gastric sleeve in place   TECHNOLOGIST PROVIDED HISTORY:       Additional Contrast?->None   Ordering Physician Provided Reason for Exam: pain swelling   Acuity: Acute       FINDINGS:   Lower Chest: Dependent changes are seen within the lung bases bilaterally.       Organs: Patient is status post cholecystectomy.  The liver, spleen, pancreas   and right adrenal gland demonstrate no acute abnormality.  A 10 mm left   adrenal nodule is seen.  The kidneys demonstrate symmetric enhancement.  No   focal renal mass or hydronephrosis.       GI/Bowel: A lap band is seen in the region of the proximal stomach.  There is   no evidence of bowel obstruction.  Normal appendix.       Pelvis: pancreas and kidneys are normal. No aneurysm formation  is present. The bowel gas pattern is nonobstructive. The appendix is normal. Images through  the pelvis demonstrate a normal appearance to the urinary bladder. Nabothian cysts are seen  within the cervix. No focal inflammatory changes or lymphadenopathy seen within the abdomen  or pelvis. Bony structures appear normal for age.      ++ IMPRESSION ++       No acute findings noted within the abdomen or pelvis.      I concur with the preliminary reading. Past Medical History:   Diagnosis Date    ADHD (attention deficit hyperactivity disorder)     Amenorrhea     Anemia     Anxiety     Depression     Frequent headaches     History of colonic polyps     Hyperprolactinemia (HCC)     Hypothyroidism     Mitral valve prolapse     Obstructive sleep apnea     Thyroid nodule     UTI (lower urinary tract infection)     frequent, pyelonephritis     Patient Active Problem List    Diagnosis Date Noted    Elevated cortisol level ([address] 09/03/2018    Mixed hyperlipidemia 08/20/2018    PCOS (polycystic ovarian syndrome) 05/09/2018    Class 1 obesity in adult 04/09/2018    Secondary oligomenorrhea 04/09/2018    Vitamin D deficiency 04/09/2018    Adrenal nodule ([address] 04/09/2018    Flushing 04/09/2018    Hyperprolactinemia ([address]     Hypothyroidism     Multinodular goiter     Anemia      Past Surgical History:   Procedure Laterality Date    EYE SURGERY      x 4    GASTRIC BYPASS SURGERY  01/2018    THYROID SURGERY       History reviewed. No pertinent family history.   Social History     Social History    Marital status:      Spouse name: N/A    Number of children: N/A    Years of education: N/A     Social History Main Topics    Smoking status: Never Smoker    Smokeless tobacco: Never Used    Alcohol use No      Comment: occasionally    Drug use: No    Sexual activity: No     Other Topics Concern    None     Social HOURS 9 tablet 0     No current facility-administered medications for this visit.       Allergies   Allergen Reactions    Bactrim [Sulfamethoxazole-Trimethoprim] Nausea And Vomiting    Naproxen Nausea And Vomiting     Family Status   Relation Status    Father Alive        colonic polyps, erray of Sms    PUn  at age 62        kidney issues, stomach CA??    PGM  at age 63s        kidney issues       Review of Systems:  Constitutional: has fatigue, no fever, no recent weight gain, has intentional weight loss after bypass, no changes in appetite  Eyes: no eye pain, has change in vision, no eye redness, no eye irritation, no double vision  Ears, nose, throat: no nasal congestion, no sore throat, no earache, no decrease in hearing, no hoarseness, no dry mouth, no sinus problems, no difficulty swallowing, no neck lumps, no dental problems, no mouth sores, no ringing in ears  Pulmonary: has shortness of breath, has wheezing, no cough  Cardiovascular: no chest pain, has lower extremity edema, has palpitations  Gastrointestinal: no abdominal pain, no nausea, no vomiting, no diarrhea, no constipation, no heartburn, no bloating  Genitourinary: no dysuria, no urinary incontinence, no urinary hesitancy, no change in urinary frequency, no feelings of urinary urgency, no nocturia  Musculoskeletal: no joint swelling, has joint stiffness, has joint pain, no muscle cramps, no muscle pain, no bone pain  Integument/Breast: has hair loss, no skin rashes, no skin lesions, has itching, has dry skin, no breast pain, no breast mass, no skin hives, no skin discoloration, no nipple discharge, has stria  Neurological: no numbness, no tingling, no weakness, no confusion, has headaches, has dizziness, no fainting, no tremors, no decrease in memory, no balance problems  Psychiatric: has anxiety, has depression, has insomnia  Hematologic/Lymphatic: no tendency for easy bleeding, no swollen lymph nodes, has tendency for easy

## 2018-10-13 ENCOUNTER — TELEPHONE (OUTPATIENT)
Dept: ENDOCRINOLOGY | Age: 38
End: 2018-10-13

## 2018-10-13 LAB — CORTISOL SALIVARY: 0.12 UG/DL

## 2018-10-15 ASSESSMENT — ENCOUNTER SYMPTOMS
ABDOMINAL PAIN: 0
DIARRHEA: 0
BLOOD IN STOOL: 0
VOMITING: 0
CONSTIPATION: 0
NAUSEA: 0

## 2018-10-17 DIAGNOSIS — D72.829 LEUKOCYTOSIS, UNSPECIFIED TYPE: ICD-10-CM

## 2018-10-17 LAB
BASOPHILS ABSOLUTE: 0.1 K/UL (ref 0–0.2)
BASOPHILS RELATIVE PERCENT: 0.9 %
EOSINOPHILS ABSOLUTE: 0.2 K/UL (ref 0–0.6)
EOSINOPHILS RELATIVE PERCENT: 1.2 %
HCT VFR BLD CALC: 38 % (ref 36–48)
HEMOGLOBIN: 12 G/DL (ref 12–16)
LYMPHOCYTES ABSOLUTE: 3 K/UL (ref 1–5.1)
LYMPHOCYTES RELATIVE PERCENT: 23.1 %
MCH RBC QN AUTO: 27.2 PG (ref 26–34)
MCHC RBC AUTO-ENTMCNC: 31.7 G/DL (ref 31–36)
MCV RBC AUTO: 85.8 FL (ref 80–100)
MONOCYTES ABSOLUTE: 0.9 K/UL (ref 0–1.3)
MONOCYTES RELATIVE PERCENT: 7.1 %
NEUTROPHILS ABSOLUTE: 8.8 K/UL (ref 1.7–7.7)
NEUTROPHILS RELATIVE PERCENT: 67.7 %
PDW BLD-RTO: 15.6 % (ref 12.4–15.4)
PLATELET # BLD: 359 K/UL (ref 135–450)
PMV BLD AUTO: 8.3 FL (ref 5–10.5)
RBC # BLD: 4.43 M/UL (ref 4–5.2)
WBC # BLD: 13.1 K/UL (ref 4–11)

## 2018-10-22 ENCOUNTER — HOSPITAL ENCOUNTER (OUTPATIENT)
Dept: ULTRASOUND IMAGING | Age: 38
Discharge: HOME | End: 2018-10-22
Payer: MEDICARE

## 2018-10-22 ENCOUNTER — HOSPITAL ENCOUNTER (OUTPATIENT)
Dept: CT IMAGING | Age: 38
Discharge: HOME | End: 2018-10-22
Payer: MEDICARE

## 2018-10-22 DIAGNOSIS — R10.10 PAIN OF UPPER ABDOMEN: ICD-10-CM

## 2018-10-22 DIAGNOSIS — R11.11 VOMITING WITHOUT NAUSEA, INTRACTABILITY OF VOMITING NOT SPECIFIED, UNSPECIFIED VOMITING TYPE: ICD-10-CM

## 2018-10-22 DIAGNOSIS — R51.9 PERSISTENT HEADACHES: ICD-10-CM

## 2018-10-22 PROCEDURE — 70450 CT HEAD/BRAIN W/O DYE: CPT

## 2018-10-22 PROCEDURE — 76700 US EXAM ABDOM COMPLETE: CPT

## 2018-10-24 ENCOUNTER — TELEPHONE (OUTPATIENT)
Dept: INTERNAL MEDICINE CLINIC | Age: 38
End: 2018-10-24

## 2018-10-24 NOTE — TELEPHONE ENCOUNTER
Patient had an Ulta sound of the abdomen and a ct of hte heard on Monday at [facility]. She would like to know if we received the results.     cb [phone]

## 2018-10-30 ENCOUNTER — OFFICE VISIT (OUTPATIENT)
Dept: INTERNAL MEDICINE CLINIC | Age: 38
End: 2018-10-30
Payer: MEDICARE

## 2018-10-30 VITALS
WEIGHT: 174.4 LBS | OXYGEN SATURATION: 99 % | HEIGHT: 67 IN | SYSTOLIC BLOOD PRESSURE: 118 MMHG | DIASTOLIC BLOOD PRESSURE: 70 MMHG | HEART RATE: 100 BPM | BODY MASS INDEX: 27.37 KG/M2

## 2018-10-30 DIAGNOSIS — D72.829 LEUKOCYTOSIS, UNSPECIFIED TYPE: ICD-10-CM

## 2018-10-30 DIAGNOSIS — G43.009 MIGRAINE WITHOUT AURA AND WITHOUT STATUS MIGRAINOSUS, NOT INTRACTABLE: ICD-10-CM

## 2018-10-30 DIAGNOSIS — K76.0 FATTY LIVER: ICD-10-CM

## 2018-10-30 DIAGNOSIS — R51.9 PERSISTENT HEADACHES: Primary | ICD-10-CM

## 2018-10-30 DIAGNOSIS — R11.0 NAUSEA: ICD-10-CM

## 2018-10-30 DIAGNOSIS — Z23 NEED FOR INFLUENZA VACCINATION: ICD-10-CM

## 2018-10-30 PROCEDURE — 90686 IIV4 VACC NO PRSV 0.5 ML IM: CPT | Performed by: INTERNAL MEDICINE

## 2018-10-30 PROCEDURE — G8417 CALC BMI ABV UP PARAM F/U: HCPCS | Performed by: INTERNAL MEDICINE

## 2018-10-30 PROCEDURE — G0008 ADMIN INFLUENZA VIRUS VAC: HCPCS | Performed by: INTERNAL MEDICINE

## 2018-10-30 PROCEDURE — G8482 FLU IMMUNIZE ORDER/ADMIN: HCPCS | Performed by: INTERNAL MEDICINE

## 2018-10-30 PROCEDURE — 1036F TOBACCO NON-USER: CPT | Performed by: INTERNAL MEDICINE

## 2018-10-30 PROCEDURE — 99214 OFFICE O/P EST MOD 30 MIN: CPT | Performed by: INTERNAL MEDICINE

## 2018-10-30 PROCEDURE — G8427 DOCREV CUR MEDS BY ELIG CLIN: HCPCS | Performed by: INTERNAL MEDICINE

## 2018-10-30 RX ORDER — PROMETHAZINE HYDROCHLORIDE 25 MG/1
25 TABLET ORAL EVERY 6 HOURS PRN
Qty: 30 TABLET | Refills: 1 | Status: SHIPPED | OUTPATIENT
Start: 2018-10-30 | End: 2019-03-06 | Stop reason: SDUPTHER

## 2018-10-30 RX ORDER — ELETRIPTAN HYDROBROMIDE 40 MG/1
TABLET, FILM COATED ORAL
Qty: 6 TABLET | Refills: 0 | Status: SHIPPED | OUTPATIENT
Start: 2018-10-30

## 2018-10-30 NOTE — PROGRESS NOTES
[Sulfamethoxazole-Trimethoprim] Nausea And Vomiting    Naproxen Nausea And Vomiting     Outpatient Prescriptions Marked as Taking for the 10/30/18 encounter (Office Visit) with Eze Haines MD   Medication Sig Dispense Refill    promethazine (PHENERGAN) 25 MG tablet Take 1 tablet by mouth every 6 hours as needed for Nausea (vomiting) 30 tablet 1    ARMOUR THYROID 15 MG tablet Take 1 tablet by mouth daily Total daily dose 75 mg 30 tablet 3    ARMOUR THYROID 60 MG tablet TAKE 1 TABLET BY MOUTH EVERY DAY. Total daily dose 75 mg 30 tablet 3    magnesium (MAGNESIUM-OXIDE) 250 MG TABS tablet Take 1 tablet by mouth daily 30 tablet 0    pseudoephedrine-guaiFENesin (MUCINEX D)  MG per extended release tablet Take 1 tablet by mouth 2 times daily as needed (sinus pressure) 18 tablet 0    metFORMIN (GLUCOPHAGE) 500 MG tablet Take 1 tablet by mouth 3 times daily (with meals) 270 tablet 1    risperiDONE (RISPERDAL) 2 MG tablet Take 1 tablet by mouth nightly 60 tablet 0    VENTOLIN  (90 Base) MCG/ACT inhaler INHALE 2 PUFFS INTO THE LUNGS EVERY 6 HOURS AS NEEDED FOR WHEEZING AND SHORTNESS OF BREATH 18 g 0    dicyclomine (BENTYL) 10 MG capsule Take 1 capsule by mouth every 6 hours as needed (cramps) 20 capsule 0    Venlafaxine HCl (EFFEXOR PO) Take by mouth      buPROPion (WELLBUTRIN XL) 300 MG XL tablet   Take 300 mg by mouth every morning       zolpidem (AMBIEN) 10 MG tablet   Take 10 mg by mouth nightly as needed       clonazePAM (KLONOPIN) 2 MG tablet Take 2 mg by mouth 3 times daily as needed for Anxiety. Vitals:    10/30/18 1144   BP: 118/70   Site: Left Upper Arm   Position: Sitting   Cuff Size: Medium Adult   Pulse: 100   SpO2: 99%   Weight: 174 lb 6.4 oz (79.1 kg)   Height: 5' 7\" (1.702 m)     Body mass index is 27.31 kg/m². Physical Exam   Constitutional: She is oriented to person, place, and time. She appears well-developed and well-nourished. No distress.    HENT:   Right Ear: Date Value    Cortisol, Saliva 10/06/2018 0.100     Cortisol, Saliva 10/05/2018 0.061     Cortisol, Saliva 10/07/2018 0.119    Orders Only on 10/04/2018   Component Date Value    ACTH 10/04/2018 <5*    Cortisol - AM 10/04/2018 <0.8*    T4 Free 10/04/2018 0.9     TSH 10/04/2018 0.57     T3, Free 10/04/2018 2.4    Orders Only on 10/02/2018   Component Date Value    Cortisol - AM 10/02/2018 0.9*    ACTH 10/02/2018 6    Orders Only on 10/01/2018   Component Date Value    ACTH 10/01/2018 26     Cortisol - AM 10/01/2018 14.0    Orders Only on 09/25/2018   Component Date Value    Sodium 09/25/2018 140     Potassium 09/25/2018 4.4     Chloride 09/25/2018 101     CO2 09/25/2018 24     Anion Gap 09/25/2018 15     Glucose 09/25/2018 86     BUN 09/25/2018 6*    CREATININE 09/25/2018 0.8     GFR Non- 09/25/2018 >60     GFR  09/25/2018 >60     Calcium 09/25/2018 9.3     Total Protein 09/25/2018 6.7     Alb 09/25/2018 4.0     Albumin/Globulin Ratio 09/25/2018 1.5     Total Bilirubin 09/25/2018 <0.2     Alkaline Phosphatase 09/25/2018 130*    ALT 09/25/2018 25     AST 09/25/2018 19     Globulin 09/25/2018 2.7     WBC 09/25/2018 11.9*    RBC 09/25/2018 4.64     Hemoglobin 09/25/2018 12.6     Hematocrit 09/25/2018 39.0     MCV 09/25/2018 84.0     MCH 09/25/2018 27.2     MCHC 09/25/2018 32.4     RDW 09/25/2018 15.5*    Platelets 53/43/6524 376     MPV 09/25/2018 8.1     Neutrophils % 09/25/2018 60.4     Lymphocytes % 09/25/2018 28.2     Monocytes % 09/25/2018 8.1     Eosinophils % 09/25/2018 2.4     Basophils % 09/25/2018 0.9     Neutrophils # 09/25/2018 7.2     Lymphocytes # 09/25/2018 3.4     Monocytes # 09/25/2018 1.0     Eosinophils # 09/25/2018 0.3     Basophils # 09/25/2018 0.1    Office Visit on 09/25/2018   Component Date Value    Color, UA 09/25/2018 Yellow     Clarity, UA 09/25/2018 Clear     Glucose, UA POC 09/25/2018 N     Bilirubin, UA 09/25/2018 N     Ketones, UA 09/25/2018 N     Spec Grav, UA 09/25/2018 1.030     Blood, UA POC 09/25/2018 N     pH, UA 09/25/2018 5.5     Protein, UA POC 09/25/2018 N     Urobilinogen, UA 09/25/2018 0.2     Leukocytes, UA 09/25/2018 N     Nitrite, UA 09/25/2018 N    Orders Only on 09/11/2018   Component Date Value    Cortisol,F,ug/L,U 09/10/2018 9.48     Cortisol,Ur Free 09/10/2018 15.80     Urine Total Volume 09/10/2018 2000     Hours Collected 09/10/2018 24     Cortisol (Ur), Free 09/10/2018 19.0     Creatinine, Ur 09/10/2018 60     Creatinine, 24H Ur 09/10/2018 1200     Interpretation 09/10/2018 See Note    Orders Only on 08/29/2018   Component Date Value    Sodium 08/29/2018 142     Potassium 08/29/2018 4.2     Chloride 08/29/2018 103     CO2 08/29/2018 23     Anion Gap 08/29/2018 16     Glucose 08/29/2018 84     BUN 08/29/2018 11     CREATININE 08/29/2018 0.9     GFR Non- 08/29/2018 >60     GFR  08/29/2018 >60     Calcium 08/29/2018 9.3     Total Protein 08/29/2018 6.3*    Alb 08/29/2018 4.2     Albumin/Globulin Ratio 08/29/2018 2.0     Total Bilirubin 08/29/2018 0.3     Alkaline Phosphatase 08/29/2018 111     ALT 08/29/2018 27     AST 08/29/2018 22     Globulin 08/29/2018 2.1     T3, Free 08/29/2018 2.2*    T4 Free 08/29/2018 0.8*    TSH 08/29/2018 3.33     DHEAS (DHEA Sulfate) 08/29/2018 320.0*    Testosterone 08/29/2018 42     Sex Hormone Binding 08/29/2018 31     Testosterone, Free 08/29/2018 7.8     Prolactin 08/29/2018 26.4     Vit D, 25-Hydroxy 08/29/2018 48.6     Cortisol, Saliva 08/26/2018 0.226     Cortisol, Saliva 08/28/2018 0.104     Cortisol, Saliva 08/27/2018 0.119     Rejected Test 08/29/2018 8117     Reason for Rejection 08/29/2018 last names dont     Rejected Test 08/29/2018 97098     Reason for Rejection 08/29/2018 pt names dont          Assessment/Plan     1.  Persistent headaches  -Follow up with

## 2018-10-30 NOTE — PATIENT INSTRUCTIONS
This risk has been estimated at 1 or 2 additional cases per million people vaccinated. This is much lower than the risk of severe complications from flu, which can be prevented by flu vaccine. · Vipul Chol children who get the flu shot along with pneumococcal vaccine (PCV13) and/or DTaP vaccine at the same time might be slightly more likely to have a seizure caused by fever. Ask your doctor for more information. Tell your doctor if a child who is getting flu vaccine has ever had a seizure  Problems that could happen after any injected vaccine:  · People sometimes faint after a medical procedure, including vaccination. Sitting or lying down for about 15 minutes can help prevent fainting, and injuries caused by a fall. Tell your doctor if you feel dizzy, or have vision changes or ringing in the ears. · Some people get severe pain in the shoulder and have difficulty moving the arm where a shot was given. This happens very rarely. · Any medication can cause a severe allergic reaction. Such reactions from a vaccine are very rare, estimated at about 1 in a million doses, and would happen within a few minutes to a few hours after the vaccination. As with any medicine, there is a very remote chance of a vaccine causing a serious injury or death. The safety of vaccines is always being monitored. For more information, visit: www.cdc.gov/vaccinesafety/. What if there is a serious reaction? What should I look for? · Look for anything that concerns you, such as signs of a severe allergic reaction, very high fever, or unusual behavior. Signs of a severe allergic reaction can include hives, swelling of the face and throat, difficulty breathing, a fast heartbeat, dizziness, and weakness - usually within a few minutes to a few hours after the vaccination. What should I do?   · If you think it is a severe allergic reaction or other emergency that can't wait, call 9-1-1 and get the person to the nearest hospital. Otherwise, call

## 2018-10-31 ASSESSMENT — ENCOUNTER SYMPTOMS
SHORTNESS OF BREATH: 0
SINUS PRESSURE: 0
ABDOMINAL PAIN: 0
RHINORRHEA: 0
WHEEZING: 0
COUGH: 0
CONSTIPATION: 0
DIARRHEA: 0
VOMITING: 0
NAUSEA: 1
CHEST TIGHTNESS: 0
SORE THROAT: 0
BLOOD IN STOOL: 0

## 2018-11-16 ENCOUNTER — E-VISIT (OUTPATIENT)
Dept: INTERNAL MEDICINE CLINIC | Age: 38
End: 2018-11-16
Payer: MEDICARE

## 2018-11-16 DIAGNOSIS — J32.9 VIRAL SINUSITIS: Primary | ICD-10-CM

## 2018-11-16 DIAGNOSIS — B97.89 VIRAL SINUSITIS: Primary | ICD-10-CM

## 2018-11-16 PROCEDURE — 98969 PR NONPHYSICIAN ONLINE ASSESSMENT AND MANAGEMENT: CPT | Performed by: NURSE PRACTITIONER

## 2018-11-16 RX ORDER — BROMPHENIRAMINE MALEATE, PSEUDOEPHEDRINE HYDROCHLORIDE, AND DEXTROMETHORPHAN HYDROBROMIDE 2; 30; 10 MG/5ML; MG/5ML; MG/5ML
10 SYRUP ORAL 4 TIMES DAILY PRN
Qty: 120 ML | Refills: 0 | Status: SHIPPED | OUTPATIENT
Start: 2018-11-16

## 2018-11-16 RX ORDER — METHYLPREDNISOLONE 4 MG/1
TABLET ORAL
Qty: 1 KIT | Refills: 0 | Status: SHIPPED | OUTPATIENT
Start: 2018-11-16

## 2018-11-16 ASSESSMENT — LIFESTYLE VARIABLES: SMOKING_STATUS: NO

## 2018-12-10 NOTE — TELEPHONE ENCOUNTER
Patient sts she was seen 2 days ago for  uri -pt sts she is  Worse now  --coughing and blowing  green phlegm -chest carin-no wheezing - low grade temp  -face and ear pressure -would like something called in  pharm [phone]  Any question call [phone] Reason for Call: Patient called in to provide fax number to his school [phone]    Caller Information       Type Contact Phone    12/10/2018 10:37 AM Phone (Incoming) Jesus Caldwell (Self) [phone] (H)          Alternative phone number: [phone]    Turnaround time given to caller:   \"This message will be sent to [state Provider's name]. The clinical team will fulfill your request as soon as they review your message.\"

## 2019-01-10 PROBLEM — E66.3 OVERWEIGHT (BMI 25.0-29.9): Status: ACTIVE | Noted: 2018-04-09

## 2019-01-11 ENCOUNTER — OFFICE VISIT (OUTPATIENT)
Dept: ENDOCRINOLOGY | Age: 39
End: 2019-01-11
Payer: MEDICARE

## 2019-01-11 VITALS
HEIGHT: 67 IN | BODY MASS INDEX: 26.43 KG/M2 | WEIGHT: 168.4 LBS | SYSTOLIC BLOOD PRESSURE: 118 MMHG | DIASTOLIC BLOOD PRESSURE: 77 MMHG | HEART RATE: 105 BPM

## 2019-01-11 DIAGNOSIS — E55.9 VITAMIN D DEFICIENCY: ICD-10-CM

## 2019-01-11 DIAGNOSIS — E28.2 PCOS (POLYCYSTIC OVARIAN SYNDROME): ICD-10-CM

## 2019-01-11 DIAGNOSIS — E04.2 MULTINODULAR GOITER: ICD-10-CM

## 2019-01-11 DIAGNOSIS — E78.2 MIXED HYPERLIPIDEMIA: ICD-10-CM

## 2019-01-11 DIAGNOSIS — N91.4 SECONDARY OLIGOMENORRHEA: ICD-10-CM

## 2019-01-11 DIAGNOSIS — E66.3 OVERWEIGHT (BMI 25.0-29.9): ICD-10-CM

## 2019-01-11 DIAGNOSIS — E03.9 ACQUIRED HYPOTHYROIDISM: Primary | ICD-10-CM

## 2019-01-11 DIAGNOSIS — R79.89 ELEVATED CORTISOL LEVEL: ICD-10-CM

## 2019-01-11 DIAGNOSIS — E22.1 HYPERPROLACTINEMIA (HCC): ICD-10-CM

## 2019-01-11 DIAGNOSIS — E03.9 ACQUIRED HYPOTHYROIDISM: ICD-10-CM

## 2019-01-11 DIAGNOSIS — E27.8 ADRENAL NODULE (HCC): ICD-10-CM

## 2019-01-11 LAB
A/G RATIO: 1.9 (ref 1.1–2.2)
ALBUMIN SERPL-MCNC: 4.3 G/DL (ref 3.4–5)
ALP BLD-CCNC: 103 U/L (ref 40–129)
ALT SERPL-CCNC: 16 U/L (ref 10–40)
ANION GAP SERPL CALCULATED.3IONS-SCNC: 14 MMOL/L (ref 3–16)
AST SERPL-CCNC: 17 U/L (ref 15–37)
BILIRUB SERPL-MCNC: <0.2 MG/DL (ref 0–1)
BUN BLDV-MCNC: 7 MG/DL (ref 7–20)
CALCIUM SERPL-MCNC: 9.5 MG/DL (ref 8.3–10.6)
CHLORIDE BLD-SCNC: 103 MMOL/L (ref 99–110)
CHOLESTEROL, TOTAL: 164 MG/DL (ref 0–199)
CO2: 23 MMOL/L (ref 21–32)
CREAT SERPL-MCNC: 0.7 MG/DL (ref 0.6–1.1)
ESTRADIOL LEVEL: 26 PG/ML
FOLLICLE STIMULATING HORMONE: 4.3 MIU/ML
GFR AFRICAN AMERICAN: >60
GFR NON-AFRICAN AMERICAN: >60
GLOBULIN: 2.3 G/DL
GLUCOSE BLD-MCNC: 62 MG/DL (ref 70–99)
HDLC SERPL-MCNC: 47 MG/DL (ref 40–60)
LDL CHOLESTEROL CALCULATED: 86 MG/DL
LUTEINIZING HORMONE: 5.5 MIU/ML
POTASSIUM SERPL-SCNC: 4.7 MMOL/L (ref 3.5–5.1)
PROLACTIN: 31.9 NG/ML
SODIUM BLD-SCNC: 140 MMOL/L (ref 136–145)
T3 FREE: 3.5 PG/ML (ref 2.3–4.2)
T4 FREE: 1.1 NG/DL (ref 0.9–1.8)
TOTAL PROTEIN: 6.6 G/DL (ref 6.4–8.2)
TRIGL SERPL-MCNC: 154 MG/DL (ref 0–150)
TSH SERPL DL<=0.05 MIU/L-ACNC: 1.49 UIU/ML (ref 0.27–4.2)
VITAMIN D 25-HYDROXY: 45.6 NG/ML
VLDLC SERPL CALC-MCNC: 31 MG/DL

## 2019-01-11 PROCEDURE — G8417 CALC BMI ABV UP PARAM F/U: HCPCS | Performed by: INTERNAL MEDICINE

## 2019-01-11 PROCEDURE — 99215 OFFICE O/P EST HI 40 MIN: CPT | Performed by: INTERNAL MEDICINE

## 2019-01-11 PROCEDURE — G8427 DOCREV CUR MEDS BY ELIG CLIN: HCPCS | Performed by: INTERNAL MEDICINE

## 2019-01-11 PROCEDURE — G8482 FLU IMMUNIZE ORDER/ADMIN: HCPCS | Performed by: INTERNAL MEDICINE

## 2019-01-11 PROCEDURE — 1036F TOBACCO NON-USER: CPT | Performed by: INTERNAL MEDICINE

## 2019-01-11 RX ORDER — THYROID 60 MG
TABLET ORAL
Qty: 30 TABLET | Refills: 3 | Status: SHIPPED | OUTPATIENT
Start: 2019-01-11 | End: 2019-05-14 | Stop reason: SDUPTHER

## 2019-01-11 RX ORDER — THYROID,PORK 15 MG
15 TABLET ORAL DAILY
Qty: 30 TABLET | Refills: 3 | Status: SHIPPED | OUTPATIENT
Start: 2019-01-11 | End: 2020-03-30

## 2019-01-15 LAB
ADRENOCORTICOTROPIC HORMONE: 17 PG/ML (ref 6–58)
DHEAS (DHEA SULFATE): 246 UG/DL (ref 45–270)
IGF-1 (INSULIN-LIKE GROWTH I): 106 NG/ML (ref 80–277)
INSULIN-LIKE GROWTH FACTOR-1 Z-SCORE: -1.3
SEX HORMONE BINDING GLOBULIN: 32 NMOL/L (ref 30–135)
TESTOSTERONE FREE-NONMALE: 9.2 PG/ML (ref 1.3–9.2)
TESTOSTERONE TOTAL: 50 NG/DL (ref 20–70)

## 2019-01-22 DIAGNOSIS — G43.009 MIGRAINE WITHOUT AURA AND WITHOUT STATUS MIGRAINOSUS, NOT INTRACTABLE: ICD-10-CM

## 2019-01-22 RX ORDER — SUMATRIPTAN 100 MG/1
TABLET, FILM COATED ORAL
Qty: 9 TABLET | Refills: 0 | Status: SHIPPED | OUTPATIENT
Start: 2019-01-22 | End: 2019-03-05 | Stop reason: SDUPTHER

## 2019-03-04 ENCOUNTER — TELEPHONE (OUTPATIENT)
Dept: INTERNAL MEDICINE CLINIC | Age: 39
End: 2019-03-04

## 2019-03-04 DIAGNOSIS — R11.0 NAUSEA: ICD-10-CM

## 2019-03-04 DIAGNOSIS — G43.009 MIGRAINE WITHOUT AURA AND WITHOUT STATUS MIGRAINOSUS, NOT INTRACTABLE: ICD-10-CM

## 2019-03-04 RX ORDER — PROMETHAZINE HYDROCHLORIDE 25 MG/1
25 TABLET ORAL EVERY 6 HOURS PRN
Qty: 30 TABLET | Refills: 1 | Status: CANCELLED | OUTPATIENT
Start: 2019-03-04

## 2019-03-04 RX ORDER — SUMATRIPTAN 100 MG/1
TABLET, FILM COATED ORAL
Qty: 9 TABLET | Refills: 0 | Status: CANCELLED | OUTPATIENT
Start: 2019-03-04

## 2019-03-05 DIAGNOSIS — G43.009 MIGRAINE WITHOUT AURA AND WITHOUT STATUS MIGRAINOSUS, NOT INTRACTABLE: ICD-10-CM

## 2019-03-05 DIAGNOSIS — R11.0 NAUSEA: ICD-10-CM

## 2019-03-06 RX ORDER — PROMETHAZINE HYDROCHLORIDE 25 MG/1
25 TABLET ORAL EVERY 6 HOURS PRN
Qty: 30 TABLET | Refills: 0 | Status: SHIPPED | OUTPATIENT
Start: 2019-03-06

## 2019-03-06 RX ORDER — SUMATRIPTAN 100 MG/1
TABLET, FILM COATED ORAL
Qty: 9 TABLET | Refills: 1 | Status: SHIPPED | OUTPATIENT
Start: 2019-03-06

## 2019-03-25 ENCOUNTER — CLINICAL DOCUMENTATION (OUTPATIENT)
Dept: INTERNAL MEDICINE CLINIC | Age: 39
End: 2019-03-25

## 2019-10-07 DIAGNOSIS — E03.9 ACQUIRED HYPOTHYROIDISM: ICD-10-CM

## 2019-10-07 RX ORDER — THYROID 60 MG
TABLET ORAL
Qty: 30 TABLET | Refills: 0 | OUTPATIENT
Start: 2019-10-07

## 2019-10-07 RX ORDER — THYROID,PORK 15 MG
TABLET ORAL
Qty: 30 TABLET | Refills: 0 | OUTPATIENT
Start: 2019-10-07

## 2020-02-19 ENCOUNTER — NURSE TRIAGE (OUTPATIENT)
Dept: OTHER | Facility: CLINIC | Age: 40
End: 2020-02-19

## 2020-02-19 NOTE — TELEPHONE ENCOUNTER
Reason for Disposition   Fever present > 3 days (72 hours)    Protocols used: SINUS PAIN AND CONGESTION-ADULT-[place]    Patient called [facility]) to schedule appointment, with red flag complaint, transferred to RN access for triage. Pt is concerned she has a sinus infection and UTI . She has sinus pain 6/10 and congestion and cough, as well as cloudy urine with foul odor. Her temperature was 100.1 yesterday and she's had that for weeks b/c she just got over what she thinks was the flu. She has h/o UTI and increased cortisol. Caller reports symptoms as documented above. Caller informed of disposition. Pt will proceed to UC. Care advice as documented. Please do not respond to the triage nurse through this encounter. Any subsequent communication should be directly with the patient.

## 2020-03-18 NOTE — PATIENT INSTRUCTIONS
bags, handling, and disposing of trash. Wash hands after handling or disposing of trash.  Consider consulting with your local health department about trash disposal guidance if available. Information for Household Members and Caregivers of Someone who is Sick   Call ahead before visiting your doctor   Call ahead: If you have a medical appointment, call the healthcare provider and tell them that you have or may have COVID-19. This will help the healthcare provider's office take steps to keep other people from getting infected or exposed. Wear a facemask if you are sick   ; If you are sick: You should wear a facemask when you are around other people (e.g., sharing a room or vehicle) or pets and before you enter a healthcare provider's office. ; If you are caring for others: If the person who is sick is not able to wear a facemask (for example, because it causes trouble breathing), then people who live with the person who is sick should not stay in the same room with them, or they should wear a facemask if they enter a room with the person who is sick. Cover your coughs and sneezes   ; Cover: Cover your mouth and nose with a tissue when you cough or sneeze.   ; Dispose: Throw used tissues in a lined trash can.   ; Wash hands: Immediately wash your hands with soap and water for at least 20 seconds or, if soap and water are not available, clean your hands with an alcohol-based hand  that contains at least 60% alcohol. Clean your hands often   ;  Wash hands: Wash your hands often with soap and water for at least 20 seconds, especially after blowing your nose, coughing, or sneezing; going to the bathroom; and before eating or preparing food.   ; Hand : If soap and water are not readily available, use an alcohol-based hand  with at least 60% alcohol, covering all surfaces of your hands and rubbing them together until they feel dry.   ; Soap and water: Soap and water are the best option if hands are visibly dirty.   ; Avoid touching: Avoid touching your eyes, nose, and mouth with unwashed hands. Handwashing Tips   ; Wet your hands with clean, running water (warm or cold), turn off the tap, and apply soap.  ; Lather your hands by rubbing them together with the soap. Lather the backs of your hands, between your fingers, and under your nails. ; Scrub your hands for at least 20 seconds. Need a timer? Hum the Sweet Grass from beginning to end twice.  ; Rinse your hands well under clean, running water.  ; Dry your hands using a clean towel or air dry them. Avoid sharing personal household items   ; Do not share: You should not share dishes, drinking glasses, cups, eating utensils, towels, or bedding with other people or pets in your home.   ; Wash thoroughly after use: After using these items, they should be washed thoroughly with soap and water. Clean all high-touch surfaces everyday   ; Clean and disinfect: Practice routine cleaning of high touch surfaces.  ; High touch surfaces include counters, tabletops, doorknobs, bathroom fixtures, toilets, phones, keyboards, tablets, and bedside tables.  ; Disinfect areas with bodily fluids: Also, clean any surfaces that may have blood, stool, or body fluids on them.   ; Household : Use a household cleaning spray or wipe, according to the label instructions. Labels contain instructions for safe and effective use of the cleaning product including precautions you should take when applying the product, such as wearing gloves and making sure you have good ventilation during use of the product.     Monitor your symptoms   Seek medical attention: Seek prompt medical attention if your illness is worsening     (e.g., difficulty breathing).   ; Call your doctor: Before seeking care, call your healthcare provider and tell them that you have, or are being evaluated for, COVID-19.   ; Wear a facemask when sick: Put on a facemask before you enter the facility. These steps will help the healthcare provider's office to keep other people in the office or waiting room from getting infected or exposed. ; Alert health department: Ask your healthcare provider to call the local or state health department. Persons who are placed under active monitoring or facilitated self-monitoring should follow instructions provided by their local health department or occupational health professionals, as appropriate.  ; Call 911 if you have a medical emergency: If you have a medical emergency and need to call 911, notify the dispatch personnel that you have, or are being evaluated for COVID-19. If possible, put on a facemask before emergency medical services arrive. How to manage your symptoms   Take over the counter medications to manage your symptoms as you are able and tolerate:   o Tylenol or Advil as directed for fever, aches or pains  o Mucinex as directed to help with mucus and congestion  o Over the counter cough medicine - check with your primary care provider or pharmacist for suggestions   Maintain nutrition especially fluids - it is important to stay well hydrated. o Water is best.  [facility] If you have diabetes, please be careful and monitor your blood sugars.

## 2020-03-19 ENCOUNTER — OFFICE VISIT (OUTPATIENT)
Dept: PRIMARY CARE CLINIC | Age: 40
End: 2020-03-19
Payer: MEDICARE

## 2020-03-19 VITALS — HEART RATE: 94 BPM | TEMPERATURE: 99.1 F | OXYGEN SATURATION: 98 %

## 2020-03-19 LAB
INFLUENZA A ANTIBODY: NEGATIVE
INFLUENZA B ANTIBODY: NEGATIVE
S PYO AG THROAT QL: NORMAL

## 2020-03-19 PROCEDURE — 1036F TOBACCO NON-USER: CPT | Performed by: NURSE PRACTITIONER

## 2020-03-19 PROCEDURE — G8484 FLU IMMUNIZE NO ADMIN: HCPCS | Performed by: NURSE PRACTITIONER

## 2020-03-19 PROCEDURE — 87804 INFLUENZA ASSAY W/OPTIC: CPT | Performed by: NURSE PRACTITIONER

## 2020-03-19 PROCEDURE — G8421 BMI NOT CALCULATED: HCPCS | Performed by: NURSE PRACTITIONER

## 2020-03-19 PROCEDURE — 99212 OFFICE O/P EST SF 10 MIN: CPT | Performed by: NURSE PRACTITIONER

## 2020-03-19 PROCEDURE — G8428 CUR MEDS NOT DOCUMENT: HCPCS | Performed by: NURSE PRACTITIONER

## 2020-03-19 PROCEDURE — 87880 STREP A ASSAY W/OPTIC: CPT | Performed by: NURSE PRACTITIONER

## 2020-03-23 ENCOUNTER — E-VISIT (OUTPATIENT)
Dept: FAMILY MEDICINE CLINIC | Age: 40
End: 2020-03-23
Payer: MEDICARE

## 2020-03-23 ENCOUNTER — TELEPHONE (OUTPATIENT)
Dept: FAMILY MEDICINE CLINIC | Age: 40
End: 2020-03-23

## 2020-03-23 ENCOUNTER — TELEPHONE (OUTPATIENT)
Dept: OTHER | Facility: CLINIC | Age: 40
End: 2020-03-23

## 2020-03-23 PROCEDURE — 99421 OL DIG E/M SVC 5-10 MIN: CPT | Performed by: NURSE PRACTITIONER

## 2020-03-23 RX ORDER — AMOXICILLIN AND CLAVULANATE POTASSIUM 875; 125 MG/1; MG/1
1 TABLET, FILM COATED ORAL 2 TIMES DAILY
Qty: 20 TABLET | Refills: 0 | Status: SHIPPED | OUTPATIENT
Start: 2020-03-23 | End: 2020-04-02

## 2020-03-23 ASSESSMENT — LIFESTYLE VARIABLES: SMOKING_STATUS: NO, I'VE NEVER SMOKED

## 2020-03-23 NOTE — TELEPHONE ENCOUNTER
Returning triage call to patient. Informed caller E-visit is complete and to call back with any concerns.
Stable

## 2020-03-23 NOTE — PROGRESS NOTES
Symptoms consistent with sinusitis. Augmentin sent in. Fu with pcp if not improving. This visit took 5 minutes to complete.

## 2020-03-30 RX ORDER — THYROID,PORK 15 MG
TABLET ORAL
Qty: 30 TABLET | Refills: 3 | Status: SHIPPED | OUTPATIENT
Start: 2020-03-30

## 2020-03-30 RX ORDER — THYROID 60 MG
TABLET ORAL
Qty: 30 TABLET | Refills: 3 | Status: SHIPPED | OUTPATIENT
Start: 2020-03-30

## 2020-04-29 ENCOUNTER — E-VISIT (OUTPATIENT)
Dept: FAMILY MEDICINE CLINIC | Age: 40
End: 2020-04-29
Payer: MEDICARE

## 2020-04-29 PROBLEM — G43.D0 ABDOMINAL MIGRAINE, NOT INTRACTABLE: Status: ACTIVE | Noted: 2020-04-29

## 2020-04-29 PROCEDURE — 99423 OL DIG E/M SVC 21+ MIN: CPT | Performed by: PHYSICIAN ASSISTANT

## 2020-04-29 RX ORDER — ONDANSETRON 4 MG/1
4 TABLET, FILM COATED ORAL EVERY 6 HOURS PRN
Qty: 10 TABLET | Refills: 0 | Status: SHIPPED | OUTPATIENT
Start: 2020-04-29 | End: 2020-05-05

## 2020-04-29 RX ORDER — METHYLPREDNISOLONE 4 MG/1
TABLET ORAL
Qty: 1 KIT | Refills: 0 | Status: SHIPPED | OUTPATIENT
Start: 2020-04-29 | End: 2020-05-05

## 2020-04-29 RX ORDER — SUMATRIPTAN 50 MG/1
50 TABLET, FILM COATED ORAL
Qty: 9 TABLET | Refills: 0 | Status: SHIPPED | OUTPATIENT
Start: 2020-04-29 | End: 2020-04-29

## 2020-06-30 ENCOUNTER — NURSE TRIAGE (OUTPATIENT)
Dept: OTHER | Facility: CLINIC | Age: 40
End: 2020-06-30

## 2020-07-07 ENCOUNTER — TELEPHONE (OUTPATIENT)
Dept: PRIMARY CARE CLINIC | Age: 40
End: 2020-07-07

## 2020-07-07 ENCOUNTER — NURSE TRIAGE (OUTPATIENT)
Dept: OTHER | Facility: CLINIC | Age: 40
End: 2020-07-07

## 2020-07-07 NOTE — TELEPHONE ENCOUNTER
Only wants prescription for nausea medication    Reason for Disposition   COVID-19 Testing, questions about    Answer Assessment - Initial Assessment Questions  1. COVID-19 DIAGNOSIS: \"Who made your Coronavirus (COVID-19) diagnosis? \" \"Was it confirmed by a positive lab test?\" If not diagnosed by a HCP, ask \"Are there lots of cases (community spread) where you live? \" (See [facility] health department website, if unsure)      *No Answer*  2. ONSET: \"When did the COVID-19 symptoms start? \"       *No Answer*  3. WORST SYMPTOM: \"What is your worst symptom? \" (e.g., cough, fever, shortness of breath, muscle aches)      *No Answer*  4. COUGH: \"Do you have a cough? \" If so, ask: \"How bad is the cough? \"        *No Answer*  5. FEVER: \"Do you have a fever? \" If so, ask: \"What is your temperature, how was it measured, and when did it start? \"      *No Answer*  6. RESPIRATORY STATUS: \"Describe your breathing? \" (e.g., shortness of breath, wheezing, unable to speak)       *No Answer*  7. BETTER-SAME-WORSE: [place] you getting better, staying the same or getting worse compared to yesterday? \"  If getting worse, ask, \"In what way? \"      *No Answer*  8. HIGH RISK DISEASE: \"Do you have any chronic medical problems? \" (e.g., asthma, heart or lung disease, weak immune system, etc.)      *No Answer*  9. PREGNANCY: \"Is there any chance you are pregnant? \" \"When was your last menstrual period? \"      *No Answer*  10. OTHER SYMPTOMS: \"Do you have any other symptoms? \"  (e.g., chills, fatigue, headache, loss of smell or taste, muscle pain, sore throat)        *No Answer*    Protocols used: CORONAVIRUS (COVID-19) DIAGNOSED OR SUSPECTED-ADULT-

## 2020-11-18 ENCOUNTER — NURSE TRIAGE (OUTPATIENT)
Dept: OTHER | Facility: CLINIC | Age: 40
End: 2020-11-18

## 2020-11-18 NOTE — TELEPHONE ENCOUNTER
Patient called pre-service center [facility]) [place] with red flag complaint. Brief description of triage: rectal bleeding, white diarrhea stool with at least 1 cup of red blood    Triage indicates for patient to go to ED    Care advice provided, patient verbalizes understanding; denies any other questions or concerns; instructed to call back for any new or worsening symptoms. Attention Provider: Thank you for allowing me to participate in the care of your patient. The patient was connected to triage in response to information provided to the ECC. Please do not respond through this encounter as the response is not directed to a shared pool. Reason for Disposition   SEVERE rectal bleeding (large blood clots; on and off, or constant bleeding)    Answer Assessment - Initial Assessment Questions  1. APPEARANCE of BLOOD: \"What color is it? \" \"Is it passed separately, on the surface of the stool, or mixed in with the stool? \"       Stool was white with blood streaks    2. AMOUNT: \"How much blood was passed? \"       1 cup    3. FREQUENCY: \"How many times has blood been passed with the stools? \"       1 today    4. ONSET: \"When was the blood first seen in the stools? \" (Days or weeks)       Today    5. DIARRHEA: \"Is there also some diarrhea? \" If so, ask: \"How many diarrhea stools were passed in past 24 hours? \"       Yes diarrhea was involved, started today    6. CONSTIPATION: \"Do you have constipation? \" If so, \"How bad is it? \"      Was constipated then passed yesterday, before today's bloody stool    7. RECURRENT SYMPTOMS: \"Have you had blood in your stools before? \" If so, ask: \"When was the last time? \" and \"What happened that time? \"       Pass history of hemorrhoids    8. BLOOD THINNERS: \"Do you take any blood thinners? \" (e.g., Coumadin/warfarin, Pradaxa/dabigatran, aspirin)      Took advil    9. OTHER SYMPTOMS: \"Do you have any other symptoms? \"  (e.g., abdominal pain, vomiting, dizziness, fever)      Abdominal pain    10. PREGNANCY: \"Is there any chance you are pregnant? \" \"When was your last menstrual period? \"        n/a    Protocols used: RECTAL BLEEDING-ADULT-[place]

## 2021-10-25 ENCOUNTER — OFFICE VISIT (OUTPATIENT)
Dept: URGENT CARE | Age: 41
End: 2021-10-25
Payer: MEDICARE

## 2021-10-25 VITALS
HEART RATE: 68 BPM | SYSTOLIC BLOOD PRESSURE: 113 MMHG | DIASTOLIC BLOOD PRESSURE: 66 MMHG | BODY MASS INDEX: 26.06 KG/M2 | OXYGEN SATURATION: 97 % | RESPIRATION RATE: 16 BRPM | WEIGHT: 166 LBS | TEMPERATURE: 98.1 F | HEIGHT: 67 IN

## 2021-10-25 DIAGNOSIS — J06.9 UPPER RESPIRATORY TRACT INFECTION, UNSPECIFIED TYPE: ICD-10-CM

## 2021-10-25 DIAGNOSIS — Z11.52 ENCOUNTER FOR SCREENING FOR SEVERE ACUTE RESPIRATORY SYNDROME CORONAVIRUS 2 (SARS-COV-2) INFECTION: Primary | ICD-10-CM

## 2021-10-25 LAB
Lab: NORMAL
PERFORMING INSTRUMENT: NORMAL
QC PASS/FAIL: NORMAL
SARS-COV-2, POC: NORMAL

## 2021-10-25 PROCEDURE — 87426 SARSCOV CORONAVIRUS AG IA: CPT

## 2021-10-25 PROCEDURE — 99202 OFFICE O/P NEW SF 15 MIN: CPT

## 2021-10-25 RX ORDER — AMOXICILLIN AND CLAVULANATE POTASSIUM 875; 125 MG/1; MG/1
1 TABLET, FILM COATED ORAL 2 TIMES DAILY
Qty: 20 TABLET | Refills: 0 | Status: SHIPPED | OUTPATIENT
Start: 2021-10-25 | End: 2021-11-04

## 2021-10-25 RX ORDER — BENZONATATE 200 MG/1
200 CAPSULE ORAL 3 TIMES DAILY PRN
Qty: 21 CAPSULE | Refills: 0 | Status: SHIPPED | OUTPATIENT
Start: 2021-10-25 | End: 2021-11-01

## 2021-10-25 RX ORDER — ONDANSETRON 4 MG/1
4 TABLET, FILM COATED ORAL DAILY PRN
Qty: 30 TABLET | Refills: 0 | Status: SHIPPED | OUTPATIENT
Start: 2021-10-25

## 2021-10-25 RX ORDER — FLUTICASONE PROPIONATE 50 MCG
1 SPRAY, SUSPENSION (ML) NASAL DAILY
Qty: 1 EACH | Refills: 0 | Status: SHIPPED | OUTPATIENT
Start: 2021-10-25 | End: 2022-01-20

## 2021-10-25 ASSESSMENT — ENCOUNTER SYMPTOMS
DIARRHEA: 0
RHINORRHEA: 1
SINUS PAIN: 1
CONSTIPATION: 0
SHORTNESS OF BREATH: 0
VOMITING: 0
CHEST TIGHTNESS: 0
NAUSEA: 1
COUGH: 1
SINUS PRESSURE: 1

## 2021-10-25 NOTE — PATIENT INSTRUCTIONS
Covid test today in office- result negative  Take medications as prescribed. Discussed medication side effects. Obtain rest.  Maintain hydration. Take OTC pain relievers as needed. Use saline nasal spray as needed. Sleep with head elevated. Use a humidifier in room at night. Avoid smoke and other irritants. Wash hands often with soap and water. Discussed precautions and indications for returning to clinic. Discussed precautions and indications for seeking ED care. Patient Education        Sinusitis: Care Instructions  Your Care Instructions     Sinusitis is an infection of the lining of the sinus cavities in your head. Sinusitis often follows a cold. It causes pain and pressure in your head and face. In most cases, sinusitis gets better on its own in 1 to 2 weeks. But some mild symptoms may last for several weeks. Sometimes antibiotics are needed. Follow-up care is a key part of your treatment and safety. Be sure to make and go to all appointments, and call your doctor if you are having problems. It's also a good idea to know your test results and keep a list of the medicines you take. How can you care for yourself at home? · Take an over-the-counter pain medicine, such as acetaminophen (Tylenol), ibuprofen (Advil, Motrin), or naproxen (Aleve). Read and follow all instructions on the label. · If the doctor prescribed antibiotics, take them as directed. Do not stop taking them just because you feel better. You need to take the full course of antibiotics. · Be careful when taking over-the-counter cold or flu medicines and Tylenol at the same time. Many of these medicines have acetaminophen, which is Tylenol. Read the labels to make sure that you are not taking more than the recommended dose. Too much acetaminophen (Tylenol) can be harmful. · Breathe warm, moist air from a steamy shower, a hot bath, or a sink filled with hot water. Avoid cold, dry air. Using a humidifier in your home may help. Follow the directions for cleaning the machine. · Use saline (saltwater) nasal washes. This can help keep your nasal passages open and wash out mucus and bacteria. You can buy saline nose drops at a grocery store or drugstore. Or you can make your own at home by adding 1 teaspoon of salt and 1 teaspoon of baking soda to 2 cups of distilled water. If you make your own, fill a bulb syringe with the solution, insert the tip into your nostril, and squeeze gently. Gelene Legato your nose. · Put a hot, wet towel or a warm gel pack on your face 3 or 4 times a day for 5 to 10 minutes each time. · Try a decongestant nasal spray like oxymetazoline (Afrin). Do not use it for more than 3 days in a row. Using it for more than 3 days can make your congestion worse. When should you call for help? Call your doctor now or seek immediate medical care if:    · You have new or worse swelling or redness in your face or around your eyes.     · You have a new or higher fever. Watch closely for changes in your health, and be sure to contact your doctor if:    · You have new or worse facial pain.     · The mucus from your nose becomes thicker (like pus) or has new blood in it.     · You are not getting better as expected. Where can you learn more? Go to https://eCertpeAdpointseb.Inspirational Stores. org and sign in to your fintonic account. Enter F363 in the Musement box to learn more about \"Sinusitis: Care Instructions. \"     If you do not have an account, please click on the \"Sign Up Now\" link. Current as of: December 2, 2020               Content Version: 13.0  © 5252-7207 Healthwise, Incorporated. Care instructions adapted under license by [facility] If you have questions about a medical condition or this instruction, always ask your healthcare professional. [address] any warranty or liability for your use of this information.

## 2021-10-25 NOTE — PROGRESS NOTES
Kindra Santiago (: 1980) is a 37 y.o. female here for evaluation of the following chief complaint(s):  Congestion, Cough, Sinusitis, Headache, and Nausea      SUBJECTIVE:  HPI   Pt presents today with c/o symptoms of sinusitis for over a week now. She states she has tried OTC remedies including allegra and ibuprofen with minimal relief. She denies any known exposure to covid, however she would like to be tested today. Review of Systems   Constitutional: Positive for fatigue and fever. HENT: Positive for congestion, postnasal drip, rhinorrhea, sinus pressure and sinus pain. Negative for ear pain. Respiratory: Positive for cough. Negative for chest tightness and shortness of breath. Cardiovascular: Negative for chest pain and palpitations. Gastrointestinal: Positive for nausea. Negative for constipation, diarrhea and vomiting. Musculoskeletal: Negative for arthralgias and myalgias. Neurological: Positive for headaches. Negative for dizziness. Hematological: Negative for adenopathy. OBJECTIVE:  /66   Pulse 68   Temp 98.1 °F (36.7 °C)   Resp 16   Ht 5' 7\" (1.702 m)   Wt 166 lb (75.3 kg)   SpO2 97%   BMI 26.00 kg/m²    Physical Exam  Constitutional:       Appearance: Normal appearance. She is normal weight. HENT:      Head: Normocephalic. Right Ear: Tympanic membrane normal.      Left Ear: Tympanic membrane normal.      Nose: Congestion and rhinorrhea present. Right Sinus: Frontal sinus tenderness present. Left Sinus: Frontal sinus tenderness present. Mouth/Throat:      Mouth: Mucous membranes are moist.      Pharynx: Posterior oropharyngeal erythema present. Eyes:      Pupils: Pupils are equal, round, and reactive to light. Cardiovascular:      Rate and Rhythm: Normal rate and regular rhythm. Pulses: Normal pulses. Heart sounds: Normal heart sounds.    Pulmonary:      Effort: Pulmonary effort is normal.      Breath sounds: Normal breath sounds. Abdominal:      General: Bowel sounds are normal.      Palpations: Abdomen is soft. Musculoskeletal:         General: Normal range of motion. Cervical back: Normal range of motion. Lymphadenopathy:      Cervical: No cervical adenopathy. Skin:     General: Skin is warm and dry. Neurological:      General: No focal deficit present. Mental Status: She is alert and oriented to person, place, and time. Mental status is at baseline. Psychiatric:         Mood and Affect: Mood normal.         Behavior: Behavior normal.         Thought Content: Thought content normal.         Judgment: Judgment normal.         ASSESSMENT:  1. Encounter for screening for severe acute respiratory syndrome coronavirus 2 (SARS-CoV-2) infection  -     POCT COVID-19, Antigen  2. Upper respiratory tract infection, unspecified type  -     fluticasone (FLONASE) 50 MCG/ACT nasal spray; 1 spray by Each Nostril route daily 2 sprays in each nostril on day one and then 1 spray each nostril for the next 6 days, Disp-1 each, R-0Normal  -     benzonatate (TESSALON) 200 MG capsule; Take 1 capsule by mouth 3 times daily as needed for Cough, Disp-21 capsule, R-0Normal  -     amoxicillin-clavulanate (AUGMENTIN) 875-125 MG per tablet; Take 1 tablet by mouth 2 times daily for 10 days, Disp-20 tablet, R-0Normal  -     ondansetron (ZOFRAN) 4 MG tablet; Take 1 tablet by mouth daily as needed for Nausea or Vomiting, Disp-30 tablet, R-0Normal      PLAN:    Take medications as prescribed. Discussed medication side effects. Obtain rest.  Maintain hydration. Take OTC pain relievers as needed. Use saline nasal spray as needed. Sleep with head elevated. Use a humidifier in room at night. Avoid smoke and other irritants. Wash hands often with soap and water. Discussed precautions and indications for returning to clinic. Discussed precautions and indications for seeking ED care. Return if symptoms worsen or fail to improve. Electronically signed by VLAD Rosario CNP on 10/25/2021 at 2:09 PM.

## 2022-01-20 ENCOUNTER — OFFICE VISIT (OUTPATIENT)
Dept: URGENT CARE | Age: 42
End: 2022-01-20
Payer: MEDICARE

## 2022-01-20 VITALS
OXYGEN SATURATION: 100 % | DIASTOLIC BLOOD PRESSURE: 85 MMHG | TEMPERATURE: 98.6 F | HEART RATE: 144 BPM | SYSTOLIC BLOOD PRESSURE: 144 MMHG | HEIGHT: 67 IN | RESPIRATION RATE: 12 BRPM | BODY MASS INDEX: 25.27 KG/M2 | WEIGHT: 161 LBS

## 2022-01-20 DIAGNOSIS — R11.0 NAUSEA: ICD-10-CM

## 2022-01-20 DIAGNOSIS — J01.90 ACUTE SINUSITIS, RECURRENCE NOT SPECIFIED, UNSPECIFIED LOCATION: ICD-10-CM

## 2022-01-20 DIAGNOSIS — H92.03 OTALGIA OF BOTH EARS: ICD-10-CM

## 2022-01-20 DIAGNOSIS — R50.81 FEVER IN OTHER DISEASES: Primary | ICD-10-CM

## 2022-01-20 LAB
Lab: NORMAL
PERFORMING INSTRUMENT: NORMAL
QC PASS/FAIL: NORMAL
SARS-COV-2, POC: NORMAL

## 2022-01-20 PROCEDURE — G8419 CALC BMI OUT NRM PARAM NOF/U: HCPCS

## 2022-01-20 PROCEDURE — G8427 DOCREV CUR MEDS BY ELIG CLIN: HCPCS

## 2022-01-20 PROCEDURE — G8484 FLU IMMUNIZE NO ADMIN: HCPCS

## 2022-01-20 PROCEDURE — 99212 OFFICE O/P EST SF 10 MIN: CPT

## 2022-01-20 PROCEDURE — 1036F TOBACCO NON-USER: CPT

## 2022-01-20 PROCEDURE — 87426 SARSCOV CORONAVIRUS AG IA: CPT

## 2022-01-20 RX ORDER — AMOXICILLIN AND CLAVULANATE POTASSIUM 875; 125 MG/1; MG/1
1 TABLET, FILM COATED ORAL 2 TIMES DAILY
Qty: 20 TABLET | Refills: 0 | Status: SHIPPED | OUTPATIENT
Start: 2022-01-20 | End: 2022-01-30

## 2022-01-20 RX ORDER — FLUTICASONE PROPIONATE 50 MCG
1 SPRAY, SUSPENSION (ML) NASAL DAILY
Qty: 1 EACH | Refills: 0 | Status: SHIPPED | OUTPATIENT
Start: 2022-01-20

## 2022-01-20 RX ORDER — ONDANSETRON 8 MG/1
8 TABLET, ORALLY DISINTEGRATING ORAL EVERY 8 HOURS PRN
Qty: 15 TABLET | Refills: 0 | Status: SHIPPED | OUTPATIENT
Start: 2022-01-20 | End: 2022-01-25

## 2022-01-20 ASSESSMENT — ENCOUNTER SYMPTOMS
SORE THROAT: 0
SINUS PAIN: 1
RESPIRATORY NEGATIVE: 1
SINUS PRESSURE: 1
NAUSEA: 1

## 2022-01-20 NOTE — LETTER
NOTIFICATION RETURN TO WORK / SCHOOL    1/20/2022    Ms. Trish Kay  [address] [address]      To Whom It May Concern:    Trish Kay was tested for COVID-19 on 1/20, and the result was negative. She may return to work tomorrow. I recommend:return without restrictions    If there are questions or concerns, please have the patient contact our office.         Sincerely,      Kishan Page, VLAD - CNP

## 2022-01-20 NOTE — PATIENT INSTRUCTIONS
Covid swab in office today---negative  Take medications as prescribed. Discussed medication side effects. Obtain rest.  Maintain hydration. Take OTC pain relievers as needed. Use saline nasal spray as needed. Sleep with head elevated. Use a humidifier in room at night. Avoid smoke and other irritants. Wash hands often with soap and water. Discussed precautions and indications for returning to clinic. Discussed precautions and indications for seeking ED care. Patient Education        Sinusitis: Care Instructions  Your Care Instructions     Sinusitis is an infection of the lining of the sinus cavities in your head. Sinusitis often follows a cold. It causes pain and pressure in your head and face. In most cases, sinusitis gets better on its own in 1 to 2 weeks. But some mild symptoms may last for several weeks. Sometimes antibiotics are needed. Follow-up care is a key part of your treatment and safety. Be sure to make and go to all appointments, and call your doctor if you are having problems. It's also a good idea to know your test results and keep a list of the medicines you take. How can you care for yourself at home? · Take an over-the-counter pain medicine, such as acetaminophen (Tylenol), ibuprofen (Advil, Motrin), or naproxen (Aleve). Read and follow all instructions on the label. · If the doctor prescribed antibiotics, take them as directed. Do not stop taking them just because you feel better. You need to take the full course of antibiotics. · Be careful when taking over-the-counter cold or flu medicines and Tylenol at the same time. Many of these medicines have acetaminophen, which is Tylenol. Read the labels to make sure that you are not taking more than the recommended dose. Too much acetaminophen (Tylenol) can be harmful. · Breathe warm, moist air from a steamy shower, a hot bath, or a sink filled with hot water. Avoid cold, dry air. Using a humidifier in your home may help.  Follow the directions for cleaning the machine. · Use saline (saltwater) nasal washes. This can help keep your nasal passages open and wash out mucus and bacteria. You can buy saline nose drops at a grocery store or drugstore. Or you can make your own at home by adding 1 teaspoon of salt and 1 teaspoon of baking soda to 2 cups of distilled water. If you make your own, fill a bulb syringe with the solution, insert the tip into your nostril, and squeeze gently. Rangel Nay your nose. · Put a hot, wet towel or a warm gel pack on your face 3 or 4 times a day for 5 to 10 minutes each time. · Try a decongestant nasal spray like oxymetazoline (Afrin). Do not use it for more than 3 days in a row. Using it for more than 3 days can make your congestion worse. When should you call for help? Call your doctor now or seek immediate medical care if:    · You have new or worse swelling or redness in your face or around your eyes.     · You have a new or higher fever. Watch closely for changes in your health, and be sure to contact your doctor if:    · You have new or worse facial pain.     · The mucus from your nose becomes thicker (like pus) or has new blood in it.     · You are not getting better as expected. Where can you learn more? Go to https://Carrot.mxpeThermoCeramix.WestWing. org and sign in to your Cloudy Days account. Enter H249 in the [facility] box to learn more about \"Sinusitis: Care Instructions. \"     If you do not have an account, please click on the \"Sign Up Now\" link. Current as of: September 8, 2021               Content Version: 13.1  © 9036-0705 Healthwise, [employer]Like. Care instructions adapted under license by [facility] ([facility]). If you have questions about a medical condition or this instruction, always ask your healthcare professional. [address] any warranty or liability for your use of this information.

## 2022-01-20 NOTE — PROGRESS NOTES
Cliff Grace (: 1980) is a 37 y.o. female here for evaluation of the following chief complaint(s):  Covid Testing      SUBJECTIVE:  HPI  Pt presents today with c/o sinus congestion, pain and pressure that has been ongoing for over 2 weeks. Pt states she has tried conservative treatment with no relief of symptoms. She denies any known exposure to covid, but would like to be tested today. Review of Systems   Constitutional: Positive for activity change and fatigue. Negative for fever. HENT: Positive for congestion, ear pain, postnasal drip, sinus pressure and sinus pain. Negative for ear discharge, hearing loss, sore throat and tinnitus. Respiratory: Negative. Cardiovascular: Negative. Gastrointestinal: Positive for nausea. Musculoskeletal: Negative. Skin: Negative. Neurological: Positive for headaches. OBJECTIVE:  BP (!) 144/85   Pulse 144   Temp 98.6 °F (37 °C)   Resp 12   Ht 5' 7\" (1.702 m)   Wt 161 lb (73 kg)   SpO2 100%   BMI 25.22 kg/m²    Physical Exam  Vitals reviewed. Constitutional:       Appearance: She is normal weight. She is ill-appearing. HENT:      Head: Normocephalic. Right Ear: A middle ear effusion is present. Left Ear: A middle ear effusion is present. Nose:      Right Sinus: Maxillary sinus tenderness and frontal sinus tenderness present. Left Sinus: Maxillary sinus tenderness and frontal sinus tenderness present. Mouth/Throat:      Mouth: Mucous membranes are moist.      Pharynx: Oropharynx is clear. Eyes:      Extraocular Movements: Extraocular movements intact. Conjunctiva/sclera: Conjunctivae normal.      Pupils: Pupils are equal, round, and reactive to light. Cardiovascular:      Rate and Rhythm: Normal rate and regular rhythm. Pulses: Normal pulses. Heart sounds: Normal heart sounds. Pulmonary:      Effort: Pulmonary effort is normal.      Breath sounds: Normal breath sounds.    Abdominal: General: Abdomen is flat. Bowel sounds are normal.      Palpations: Abdomen is soft. Musculoskeletal:         General: Normal range of motion. Cervical back: Normal range of motion. Skin:     General: Skin is warm and dry. Capillary Refill: Capillary refill takes less than 2 seconds. Neurological:      General: No focal deficit present. Mental Status: She is alert and oriented to person, place, and time. Mental status is at baseline. Psychiatric:         Mood and Affect: Mood normal.         Behavior: Behavior normal.         Thought Content: Thought content normal.         Judgment: Judgment normal.         ASSESSMENT:  1. Fever in other diseases  -     POCT COVID-19, Antigen  2. Nausea  -     ondansetron (ZOFRAN-ODT) 8 MG TBDP disintegrating tablet; Place 1 tablet under the tongue every 8 hours as needed for Nausea or Vomiting, Disp-15 tablet, R-0Normal  3. Acute sinusitis, recurrence not specified, unspecified location  -     amoxicillin-clavulanate (AUGMENTIN) 875-125 MG per tablet; Take 1 tablet by mouth 2 times daily for 10 days, Disp-20 tablet, R-0Normal  4. Otalgia of both ears  -     fluticasone (FLONASE) 50 MCG/ACT nasal spray; 1 spray by Each Nostril route daily 2 sprays in each nostril on day one and then 1 spray each nostril for the next 6 days, Disp-1 each, R-0Normal      PLAN:  Covid swab in office today--negative  Take medications as prescribed. Discussed medication side effects. Obtain rest.  Maintain hydration. Take OTC pain relievers as needed. Use saline nasal spray as needed. Sleep with head elevated. Use a humidifier in room at night. Avoid smoke and other irritants. Wash hands often with soap and water. Discussed precautions and indications for returning to clinic. Discussed precautions and indications for seeking ED care. Return if symptoms worsen or fail to improve.      Electronically signed by VLAD Vázquez CNP on 1/20/2022 at 12:49 PM.

## 2024-08-22 ENCOUNTER — OFFICE VISIT (OUTPATIENT)
Dept: PRIMARY CARE CLINIC | Age: 44
End: 2024-08-22
Payer: MEDICARE

## 2024-08-22 VITALS
BODY MASS INDEX: 29.19 KG/M2 | DIASTOLIC BLOOD PRESSURE: 94 MMHG | HEIGHT: 67 IN | WEIGHT: 186 LBS | HEART RATE: 91 BPM | SYSTOLIC BLOOD PRESSURE: 142 MMHG | OXYGEN SATURATION: 98 % | TEMPERATURE: 97.6 F

## 2024-08-22 DIAGNOSIS — R03.0 ELEVATED BLOOD PRESSURE READING: ICD-10-CM

## 2024-08-22 DIAGNOSIS — E27.8 ADRENAL NODULE (HCC): ICD-10-CM

## 2024-08-22 DIAGNOSIS — G43.009 MIGRAINE WITHOUT AURA AND WITHOUT STATUS MIGRAINOSUS, NOT INTRACTABLE: ICD-10-CM

## 2024-08-22 DIAGNOSIS — E03.9 ACQUIRED HYPOTHYROIDISM: Primary | ICD-10-CM

## 2024-08-22 PROCEDURE — G8419 CALC BMI OUT NRM PARAM NOF/U: HCPCS | Performed by: INTERNAL MEDICINE

## 2024-08-22 PROCEDURE — G8427 DOCREV CUR MEDS BY ELIG CLIN: HCPCS | Performed by: INTERNAL MEDICINE

## 2024-08-22 PROCEDURE — 99204 OFFICE O/P NEW MOD 45 MIN: CPT | Performed by: INTERNAL MEDICINE

## 2024-08-22 PROCEDURE — 1036F TOBACCO NON-USER: CPT | Performed by: INTERNAL MEDICINE

## 2024-08-22 RX ORDER — DIAZEPAM 5 MG
5 TABLET ORAL 2 TIMES DAILY
COMMUNITY

## 2024-08-22 RX ORDER — QUETIAPINE FUMARATE 200 MG/1
200 TABLET, FILM COATED ORAL
COMMUNITY

## 2024-08-22 RX ORDER — ONDANSETRON 4 MG/1
4 TABLET, FILM COATED ORAL 3 TIMES DAILY PRN
Qty: 15 TABLET | Refills: 0 | Status: SHIPPED | OUTPATIENT
Start: 2024-08-22

## 2024-08-22 RX ORDER — SUMATRIPTAN 100 MG/1
100 TABLET, FILM COATED ORAL
Qty: 9 TABLET | Refills: 1 | Status: SHIPPED | OUTPATIENT
Start: 2024-08-22 | End: 2024-08-22

## 2024-08-22 RX ORDER — LITHIUM CARBONATE 300 MG/1
600 CAPSULE ORAL DAILY
COMMUNITY

## 2024-08-22 SDOH — ECONOMIC STABILITY: FOOD INSECURITY: WITHIN THE PAST 12 MONTHS, YOU WORRIED THAT YOUR FOOD WOULD RUN OUT BEFORE YOU GOT MONEY TO BUY MORE.: OFTEN TRUE

## 2024-08-22 SDOH — ECONOMIC STABILITY: TRANSPORTATION INSECURITY
IN THE PAST 12 MONTHS, HAS LACK OF TRANSPORTATION KEPT YOU FROM MEETINGS, WORK, OR FROM GETTING THINGS NEEDED FOR DAILY LIVING?: NO

## 2024-08-22 SDOH — ECONOMIC STABILITY: INCOME INSECURITY: HOW HARD IS IT FOR YOU TO PAY FOR THE VERY BASICS LIKE FOOD, HOUSING, MEDICAL CARE, AND HEATING?: NOT HARD AT ALL

## 2024-08-22 SDOH — ECONOMIC STABILITY: FOOD INSECURITY: WITHIN THE PAST 12 MONTHS, THE FOOD YOU BOUGHT JUST DIDN'T LAST AND YOU DIDN'T HAVE MONEY TO GET MORE.: NEVER TRUE

## 2024-08-22 SDOH — ECONOMIC STABILITY: INCOME INSECURITY: HOW HARD IS IT FOR YOU TO PAY FOR THE VERY BASICS LIKE FOOD, HOUSING, MEDICAL CARE, AND HEATING?: HARD

## 2024-08-22 SDOH — ECONOMIC STABILITY: FOOD INSECURITY: WITHIN THE PAST 12 MONTHS, THE FOOD YOU BOUGHT JUST DIDN'T LAST AND YOU DIDN'T HAVE MONEY TO GET MORE.: OFTEN TRUE

## 2024-08-22 SDOH — ECONOMIC STABILITY: FOOD INSECURITY: WITHIN THE PAST 12 MONTHS, YOU WORRIED THAT YOUR FOOD WOULD RUN OUT BEFORE YOU GOT MONEY TO BUY MORE.: NEVER TRUE

## 2024-08-22 ASSESSMENT — PATIENT HEALTH QUESTIONNAIRE - PHQ9
SUM OF ALL RESPONSES TO PHQ QUESTIONS 1-9: 2
1. LITTLE INTEREST OR PLEASURE IN DOING THINGS: SEVERAL DAYS
SUM OF ALL RESPONSES TO PHQ QUESTIONS 1-9: 2
1. LITTLE INTEREST OR PLEASURE IN DOING THINGS: SEVERAL DAYS
SUM OF ALL RESPONSES TO PHQ QUESTIONS 1-9: 2
SUM OF ALL RESPONSES TO PHQ9 QUESTIONS 1 & 2: 2
SUM OF ALL RESPONSES TO PHQ QUESTIONS 1-9: 2
2. FEELING DOWN, DEPRESSED OR HOPELESS: SEVERAL DAYS
SUM OF ALL RESPONSES TO PHQ9 QUESTIONS 1 & 2: 2
2. FEELING DOWN, DEPRESSED OR HOPELESS: SEVERAL DAYS

## 2024-08-22 NOTE — PROGRESS NOTES
Date of Visit: 2024    Delmis Osorio (:  1980) is a 44 y.o. female,  New patient here for evaluation of the following chief complaint(s):  Follow-up (Blood work. )      ASSESSMENT/PLAN:    1. Acquired hypothyroidism  Assessment & Plan:  -Continue Wilkinson Thyroid 60 mg once daily and [place] Thyroid 15 mg once daily  -Request labs from [facility]  -Referral to Endocrinology   Orders:  -     Francine Ayers MD, Endocrinology, [facility]  2. Adrenal nodule (HCC)  Assessment & Plan:  -History of adrenal nodule  -Referral to Endocrinology   Orders:  -     Francine Ayers MD, Endocrinology, [facility]  3. Migraine without aura and without status migrainosus, not intractable  Assessment & Plan:  -Not controlled  -Start sumatriptan 100 mg 1 tablet at onset of migraine and may repeat in 2 hours to a max of 2 per 24 hours as needed  -Start Zofran 4 mg 3 times daily as needed  -Avoid migraine triggers   Orders:  -     SUMAtriptan (IMITREX) 100 MG tablet; Take 1 tablet by mouth once as needed for Migraine May repeat in 2 hours to a max of 2 per 24 hours, Disp-9 tablet, R-1Normal  -     ondansetron (ZOFRAN) 4 MG tablet; Take 1 tablet by mouth 3 times daily as needed for Nausea or Vomiting, Disp-15 tablet, R-0Normal  4. Elevated blood pressure reading  Assessment & Plan:  -Blood pressure is elevated today  -Monitor       Return in about 4 weeks (around 2024) for migraines and blood pressure.    SUBJECTIVE:    Patient presents to re-establish care. Patient last seen 10/30/18.     Patient has hypothyroidism. Patient states she is supposed to take Wilkinson Thyroid 60mg daily and 15mg daily at the same time but it is too expensive. Patient states her insurance will not cover armour thyroid and it cost her $232. Patient has enough medication for 30 days. Patient states she had labs done ordered by her Gynecologist at [facility].    Patient has migraine headaches. Patient states migraines are

## 2024-08-28 ENCOUNTER — TELEPHONE (OUTPATIENT)
Dept: PRIMARY CARE CLINIC | Age: 44
End: 2024-08-28

## 2024-08-28 NOTE — TELEPHONE ENCOUNTER
Pt called in saying the last two nights it has felt like she has been having panic attacks and that her blood pressure has been 140/90. I advised pt next time she feels like that to monitor her blood pressure and her pulse. Told pt to call back tomorrow with readings so we can see what the next steps should be.   Sending you this just so you are aware

## 2024-08-30 ENCOUNTER — OFFICE VISIT (OUTPATIENT)
Dept: PRIMARY CARE CLINIC | Age: 44
End: 2024-08-30

## 2024-08-30 VITALS
WEIGHT: 176 LBS | OXYGEN SATURATION: 98 % | BODY MASS INDEX: 27.62 KG/M2 | DIASTOLIC BLOOD PRESSURE: 80 MMHG | HEART RATE: 108 BPM | TEMPERATURE: 97 F | HEIGHT: 67 IN | SYSTOLIC BLOOD PRESSURE: 106 MMHG

## 2024-08-30 DIAGNOSIS — R20.2 NUMBNESS AND TINGLING OF BOTH UPPER EXTREMITIES: ICD-10-CM

## 2024-08-30 DIAGNOSIS — E03.9 ACQUIRED HYPOTHYROIDISM: ICD-10-CM

## 2024-08-30 DIAGNOSIS — R07.9 CHEST PAIN, UNSPECIFIED TYPE: ICD-10-CM

## 2024-08-30 DIAGNOSIS — R00.0 TACHYCARDIA: ICD-10-CM

## 2024-08-30 DIAGNOSIS — R94.31 ABNORMAL EKG: ICD-10-CM

## 2024-08-30 DIAGNOSIS — R20.0 NUMBNESS AND TINGLING OF BOTH UPPER EXTREMITIES: ICD-10-CM

## 2024-08-30 DIAGNOSIS — R42 DIZZINESS: ICD-10-CM

## 2024-08-30 DIAGNOSIS — R03.0 TRANSIENT ELEVATED BLOOD PRESSURE: Primary | ICD-10-CM

## 2024-08-30 DIAGNOSIS — F41.9 ANXIETY: ICD-10-CM

## 2024-08-30 DIAGNOSIS — R41.89 BRAIN FOG: ICD-10-CM

## 2024-08-30 DIAGNOSIS — R06.02 SHORTNESS OF BREATH: ICD-10-CM

## 2024-08-30 LAB
ANION GAP SERPL CALCULATED.3IONS-SCNC: 11 MMOL/L (ref 3–16)
BILIRUBIN, POC: NORMAL
BLOOD URINE, POC: NORMAL
BUN SERPL-MCNC: 13 MG/DL (ref 7–20)
CALCIUM SERPL-MCNC: 9.5 MG/DL (ref 8.3–10.6)
CHLORIDE SERPL-SCNC: 105 MMOL/L (ref 99–110)
CLARITY, POC: CLEAR
CO2 SERPL-SCNC: 23 MMOL/L (ref 21–32)
COLOR, POC: NORMAL
CREAT SERPL-MCNC: 1 MG/DL (ref 0.6–1.1)
DEPRECATED RDW RBC AUTO: 15.2 % (ref 12.4–15.4)
GFR SERPLBLD CREATININE-BSD FMLA CKD-EPI: 71 ML/MIN/{1.73_M2}
GLUCOSE SERPL-MCNC: 83 MG/DL (ref 70–99)
GLUCOSE URINE, POC: NORMAL MG/DL
HCT VFR BLD AUTO: 38.6 % (ref 36–48)
HGB BLD-MCNC: 12.6 G/DL (ref 12–16)
KETONES, POC: NORMAL MG/DL
LEUKOCYTE EST, POC: NORMAL
MCH RBC QN AUTO: 28.2 PG (ref 26–34)
MCHC RBC AUTO-ENTMCNC: 32.7 G/DL (ref 31–36)
MCV RBC AUTO: 86.2 FL (ref 80–100)
NITRITE, POC: NORMAL
PH, POC: 6
PLATELET # BLD AUTO: 514 K/UL (ref 135–450)
PMV BLD AUTO: 8.1 FL (ref 5–10.5)
POTASSIUM SERPL-SCNC: 4.4 MMOL/L (ref 3.5–5.1)
PROTEIN, POC: NORMAL MG/DL
RBC # BLD AUTO: 4.48 M/UL (ref 4–5.2)
SODIUM SERPL-SCNC: 139 MMOL/L (ref 136–145)
SPECIFIC GRAVITY, POC: 1.02
T4 FREE SERPL-MCNC: 1 NG/DL (ref 0.9–1.8)
TSH SERPL DL<=0.005 MIU/L-ACNC: 0.97 UIU/ML (ref 0.27–4.2)
UROBILINOGEN, POC: 0.2 MG/DL
VIT B12 SERPL-MCNC: 176 PG/ML (ref 211–911)
WBC # BLD AUTO: 8.2 K/UL (ref 4–11)

## 2024-09-03 PROBLEM — R03.0 ELEVATED BLOOD PRESSURE READING: Status: ACTIVE | Noted: 2024-09-03

## 2024-09-03 PROBLEM — G43.009 MIGRAINE WITHOUT AURA AND WITHOUT STATUS MIGRAINOSUS, NOT INTRACTABLE: Status: ACTIVE | Noted: 2024-09-03

## 2024-09-03 ASSESSMENT — ENCOUNTER SYMPTOMS
SINUS PRESSURE: 0
PHOTOPHOBIA: 1
CHEST TIGHTNESS: 0
SINUS PAIN: 0
SHORTNESS OF BREATH: 0
SORE THROAT: 0
VOMITING: 1
TROUBLE SWALLOWING: 0
CONSTIPATION: 0
WHEEZING: 0
NAUSEA: 1
DIARRHEA: 0
ABDOMINAL PAIN: 0
RHINORRHEA: 0
COUGH: 0

## 2024-09-03 NOTE — ASSESSMENT & PLAN NOTE
-Continue [place] Thyroid 60 mg once daily and [place] Thyroid 15 mg once daily  -Request labs from [facility]  -Referral to Endocrinology

## 2024-09-03 NOTE — ASSESSMENT & PLAN NOTE
-Not controlled  -Start sumatriptan 100 mg 1 tablet at onset of migraine and may repeat in 2 hours to a max of 2 per 24 hours as needed  -Start Zofran 4 mg 3 times daily as needed  -Avoid migraine triggers

## 2024-09-04 DIAGNOSIS — R79.89 ELEVATED PLATELET COUNT: Primary | ICD-10-CM

## 2024-09-06 ENCOUNTER — NURSE ONLY (OUTPATIENT)
Dept: PRIMARY CARE CLINIC | Age: 44
End: 2024-09-06
Payer: MEDICARE

## 2024-09-06 DIAGNOSIS — R79.89 LOW VITAMIN B12 LEVEL: Primary | ICD-10-CM

## 2024-09-06 DIAGNOSIS — R79.89 ELEVATED PLATELET COUNT: ICD-10-CM

## 2024-09-06 LAB — PLATELET # BLD AUTO: 351 K/UL (ref 135–450)

## 2024-09-06 PROCEDURE — 96372 THER/PROPH/DIAG INJ SC/IM: CPT | Performed by: INTERNAL MEDICINE

## 2024-09-06 RX ORDER — CYANOCOBALAMIN 1000 UG/ML
1000 INJECTION, SOLUTION INTRAMUSCULAR; SUBCUTANEOUS ONCE
Status: COMPLETED | OUTPATIENT
Start: 2024-09-06 | End: 2024-09-06

## 2024-09-06 RX ADMIN — CYANOCOBALAMIN 1000 MCG: 1000 INJECTION, SOLUTION INTRAMUSCULAR; SUBCUTANEOUS at 11:01

## 2024-09-12 ENCOUNTER — OFFICE VISIT (OUTPATIENT)
Dept: CARDIOLOGY CLINIC | Age: 44
End: 2024-09-12

## 2024-09-12 VITALS
BODY MASS INDEX: 29.54 KG/M2 | DIASTOLIC BLOOD PRESSURE: 84 MMHG | SYSTOLIC BLOOD PRESSURE: 138 MMHG | HEART RATE: 106 BPM | WEIGHT: 188.6 LBS

## 2024-09-12 DIAGNOSIS — R00.2 PALPITATIONS: ICD-10-CM

## 2024-09-12 DIAGNOSIS — R94.31 ABNORMAL EKG: Primary | ICD-10-CM

## 2024-09-12 PROBLEM — R06.02 SHORTNESS OF BREATH: Status: ACTIVE | Noted: 2024-09-12

## 2024-09-12 PROBLEM — R42 DIZZINESS: Status: ACTIVE | Noted: 2024-09-12

## 2024-09-12 PROBLEM — R41.89 BRAIN FOG: Status: ACTIVE | Noted: 2024-09-12

## 2024-09-12 PROBLEM — R00.0 TACHYCARDIA: Status: ACTIVE | Noted: 2024-09-12

## 2024-09-12 PROBLEM — R20.0 NUMBNESS AND TINGLING OF BOTH UPPER EXTREMITIES: Status: ACTIVE | Noted: 2024-09-12

## 2024-09-12 PROBLEM — R03.0 TRANSIENT ELEVATED BLOOD PRESSURE: Status: ACTIVE | Noted: 2024-09-12

## 2024-09-12 PROBLEM — R20.2 NUMBNESS AND TINGLING OF BOTH UPPER EXTREMITIES: Status: ACTIVE | Noted: 2024-09-12

## 2024-09-12 PROBLEM — R07.9 CHEST PAIN: Status: ACTIVE | Noted: 2024-09-12

## 2024-09-12 PROBLEM — F41.9 ANXIETY: Status: ACTIVE | Noted: 2024-09-12

## 2024-09-12 ASSESSMENT — ENCOUNTER SYMPTOMS
SORE THROAT: 0
VOMITING: 0
NAUSEA: 1
DIARRHEA: 0
CONSTIPATION: 0
COUGH: 0
SHORTNESS OF BREATH: 1
WHEEZING: 0
ABDOMINAL PAIN: 0
RHINORRHEA: 0
BLOOD IN STOOL: 0
SINUS PAIN: 0
CHEST TIGHTNESS: 0
SINUS PRESSURE: 0

## 2024-09-13 ENCOUNTER — NURSE ONLY (OUTPATIENT)
Dept: CARDIOLOGY CLINIC | Age: 44
End: 2024-09-13

## 2024-09-23 ENCOUNTER — OFFICE VISIT (OUTPATIENT)
Dept: PRIMARY CARE CLINIC | Age: 44
End: 2024-09-23
Payer: MEDICARE

## 2024-09-23 VITALS
OXYGEN SATURATION: 98 % | WEIGHT: 184.6 LBS | SYSTOLIC BLOOD PRESSURE: 124 MMHG | DIASTOLIC BLOOD PRESSURE: 72 MMHG | HEART RATE: 104 BPM | BODY MASS INDEX: 28.91 KG/M2

## 2024-09-23 DIAGNOSIS — B02.9 HERPES ZOSTER WITHOUT COMPLICATION: Primary | ICD-10-CM

## 2024-09-23 DIAGNOSIS — G50.0 TRIGEMINAL NEURALGIA OF LEFT SIDE OF FACE: ICD-10-CM

## 2024-09-23 PROCEDURE — 1036F TOBACCO NON-USER: CPT | Performed by: INTERNAL MEDICINE

## 2024-09-23 PROCEDURE — G8419 CALC BMI OUT NRM PARAM NOF/U: HCPCS | Performed by: INTERNAL MEDICINE

## 2024-09-23 PROCEDURE — G8427 DOCREV CUR MEDS BY ELIG CLIN: HCPCS | Performed by: INTERNAL MEDICINE

## 2024-09-23 PROCEDURE — 99213 OFFICE O/P EST LOW 20 MIN: CPT | Performed by: INTERNAL MEDICINE

## 2024-09-23 RX ORDER — HYDROCODONE BITARTRATE AND ACETAMINOPHEN 5; 325 MG/1; MG/1
1 TABLET ORAL 3 TIMES DAILY PRN
Qty: 15 TABLET | Refills: 0 | Status: SHIPPED | OUTPATIENT
Start: 2024-09-23 | End: 2024-09-28

## 2024-09-23 RX ORDER — GABAPENTIN 300 MG/1
300 CAPSULE ORAL NIGHTLY
COMMUNITY
Start: 2024-09-06

## 2024-09-23 RX ORDER — DEXTROAMPHETAMINE SACCHARATE, AMPHETAMINE ASPARTATE, DEXTROAMPHETAMINE SULFATE AND AMPHETAMINE SULFATE 5; 5; 5; 5 MG/1; MG/1; MG/1; MG/1
1 TABLET ORAL 3 TIMES DAILY
COMMUNITY
Start: 2024-09-19

## 2024-09-23 RX ORDER — VALACYCLOVIR HYDROCHLORIDE 1 G/1
1000 TABLET, FILM COATED ORAL 3 TIMES DAILY
COMMUNITY
Start: 2024-09-22

## 2024-09-23 RX ORDER — ZIPRASIDONE HYDROCHLORIDE 80 MG/1
80 CAPSULE ORAL 2 TIMES DAILY WITH MEALS
COMMUNITY
Start: 2024-09-12

## 2024-09-23 RX ORDER — TRAZODONE HYDROCHLORIDE 150 MG/1
150 TABLET ORAL NIGHTLY
COMMUNITY
Start: 2024-09-12

## 2024-09-25 ENCOUNTER — OFFICE VISIT (OUTPATIENT)
Dept: PRIMARY CARE CLINIC | Age: 44
End: 2024-09-25
Payer: MEDICARE

## 2024-09-25 VITALS
WEIGHT: 184 LBS | DIASTOLIC BLOOD PRESSURE: 74 MMHG | HEART RATE: 129 BPM | SYSTOLIC BLOOD PRESSURE: 106 MMHG | BODY MASS INDEX: 28.82 KG/M2 | OXYGEN SATURATION: 96 %

## 2024-09-25 DIAGNOSIS — B02.9 HERPES ZOSTER WITHOUT COMPLICATION: Primary | ICD-10-CM

## 2024-09-25 DIAGNOSIS — H57.12 LEFT EYE PAIN: ICD-10-CM

## 2024-09-25 PROCEDURE — G8427 DOCREV CUR MEDS BY ELIG CLIN: HCPCS | Performed by: INTERNAL MEDICINE

## 2024-09-25 PROCEDURE — 1036F TOBACCO NON-USER: CPT | Performed by: INTERNAL MEDICINE

## 2024-09-25 PROCEDURE — 99213 OFFICE O/P EST LOW 20 MIN: CPT | Performed by: INTERNAL MEDICINE

## 2024-09-25 PROCEDURE — G8419 CALC BMI OUT NRM PARAM NOF/U: HCPCS | Performed by: INTERNAL MEDICINE

## 2024-09-25 RX ORDER — METHYLPREDNISOLONE 4 MG
TABLET, DOSE PACK ORAL
Qty: 1 KIT | Refills: 0 | Status: SHIPPED | OUTPATIENT
Start: 2024-09-25 | End: 2024-10-01

## 2024-09-27 PROBLEM — B02.9 HERPES ZOSTER WITHOUT COMPLICATION: Status: ACTIVE | Noted: 2024-09-27

## 2024-09-27 PROBLEM — G50.0 TRIGEMINAL NEURALGIA OF LEFT SIDE OF FACE: Status: ACTIVE | Noted: 2024-09-27

## 2024-09-27 PROBLEM — H57.12 LEFT EYE PAIN: Status: ACTIVE | Noted: 2024-09-27

## 2024-09-27 ASSESSMENT — ENCOUNTER SYMPTOMS
COLOR CHANGE: 1
SINUS PAIN: 0
FACIAL SWELLING: 1
COUGH: 0
CHEST TIGHTNESS: 0
SORE THROAT: 0
COLOR CHANGE: 1
SORE THROAT: 0
SINUS PRESSURE: 0
WHEEZING: 0
EYE PAIN: 1
EYE REDNESS: 0
WHEEZING: 0
FACIAL SWELLING: 1
RHINORRHEA: 0
SINUS PRESSURE: 0
EYE DISCHARGE: 0
RHINORRHEA: 0
CHEST TIGHTNESS: 0
SHORTNESS OF BREATH: 0
COUGH: 0
SHORTNESS OF BREATH: 0
SINUS PAIN: 0

## 2024-10-02 ENCOUNTER — TELEPHONE (OUTPATIENT)
Dept: CARDIOLOGY CLINIC | Age: 44
End: 2024-10-02

## 2024-10-02 NOTE — TELEPHONE ENCOUNTER
Pt called regarding her monitor results. Gave her Dr. Bustos results. Pt understood.     \"Please let patient know that her monitor revealed normal rhythm with occasional premature atrial complexes.  At this time if patient is symptomatic with this, we could consider starting low-dose Toprol 12.5 mg p.o. daily.  Alternatively we could just monitor for now. \"

## 2024-11-05 ENCOUNTER — TELEPHONE (OUTPATIENT)
Dept: PRIMARY CARE CLINIC | Age: 44
End: 2024-11-05

## 2024-11-05 NOTE — TELEPHONE ENCOUNTER
Tried to call [facility] back, patient is not a diabetic according to her chart. Was on hold for 13 minutes will have to call another time.

## 2024-11-05 NOTE — TELEPHONE ENCOUNTER
[facility] called, would like to get pt on this program.  Please call them at [phone]

## 2024-11-06 NOTE — TELEPHONE ENCOUNTER
Called [facility], wait time said over 30 minutes.  Will have to call at later time due to clinic needs, patient is coming in for appointment tomorrow as well. Can be discussed at that time.

## 2024-11-07 NOTE — TELEPHONE ENCOUNTER
Patient no showed appointment today, called [facility] left voicemail for return call to discuss.

## 2024-12-26 DIAGNOSIS — G43.009 MIGRAINE WITHOUT AURA AND WITHOUT STATUS MIGRAINOSUS, NOT INTRACTABLE: ICD-10-CM

## 2024-12-26 RX ORDER — ONDANSETRON 4 MG/1
TABLET, FILM COATED ORAL
Qty: 15 TABLET | Refills: 0 | Status: SHIPPED | OUTPATIENT
Start: 2024-12-26

## 2024-12-26 NOTE — TELEPHONE ENCOUNTER
Medication:   Requested Prescriptions     Pending Prescriptions Disp Refills    ondansetron (ZOFRAN) 4 MG tablet [Pharmacy Med Name: ONDANSETRON 4MG TABLETS] 15 tablet 0     Sig: TAKE 1 TABLET BY MOUTH THREE TIMES DAILY AS NEEDED FOR NAUSEA OR VOMITING     Last Filled:  8/22/2024    Last appt: 9/25/2024   Next appt: Visit date not found    Last OARRS:       9/23/2024    11:01 AM   RX Monitoring   Periodic Controlled Substance Monitoring No signs of potential drug abuse or diversion identified.

## 2025-01-02 DIAGNOSIS — G43.009 MIGRAINE WITHOUT AURA AND WITHOUT STATUS MIGRAINOSUS, NOT INTRACTABLE: ICD-10-CM

## 2025-01-02 RX ORDER — SUMATRIPTAN SUCCINATE 100 MG/1
TABLET ORAL
Qty: 9 TABLET | Refills: 1 | Status: SHIPPED | OUTPATIENT
Start: 2025-01-02

## 2025-01-02 NOTE — TELEPHONE ENCOUNTER
Medication:   Requested Prescriptions     Pending Prescriptions Disp Refills    SUMAtriptan (IMITREX) 100 MG tablet [Pharmacy Med Name: SUMATRIPTAN 100MG TABLETS] 9 tablet 1     Sig: TAKE 1 TABLET BY MOUTH ONCE AS NEEDED FOR MIGRAINE. MAY REPEAT IN 2 HOURS. DO NOT EXCEE 2 TABLETS IN 24 HOURS     Last Filled:  08/22/2024    Last appt: 9/25/2024   Next appt: Visit date not found    Last OARRS:       9/23/2024    11:01 AM   RX Monitoring   Periodic Controlled Substance Monitoring No signs of potential drug abuse or diversion identified.